# Patient Record
Sex: FEMALE | Race: WHITE
[De-identification: names, ages, dates, MRNs, and addresses within clinical notes are randomized per-mention and may not be internally consistent; named-entity substitution may affect disease eponyms.]

---

## 2017-06-14 ENCOUNTER — HOSPITAL ENCOUNTER (INPATIENT)
Dept: HOSPITAL 63 - ER | Age: 52
LOS: 4 days | Discharge: HOME | DRG: 392 | End: 2017-06-18
Attending: FAMILY MEDICINE | Admitting: FAMILY MEDICINE
Payer: SELF-PAY

## 2017-06-14 VITALS — HEIGHT: 61 IN | BODY MASS INDEX: 31.63 KG/M2 | WEIGHT: 167.5 LBS

## 2017-06-14 VITALS — DIASTOLIC BLOOD PRESSURE: 75 MMHG | SYSTOLIC BLOOD PRESSURE: 153 MMHG

## 2017-06-14 DIAGNOSIS — K31.84: Primary | ICD-10-CM

## 2017-06-14 DIAGNOSIS — Z98.51: ICD-10-CM

## 2017-06-14 DIAGNOSIS — G47.00: ICD-10-CM

## 2017-06-14 DIAGNOSIS — F15.90: ICD-10-CM

## 2017-06-14 DIAGNOSIS — Z90.49: ICD-10-CM

## 2017-06-14 DIAGNOSIS — Z90.710: ICD-10-CM

## 2017-06-14 DIAGNOSIS — D72.829: ICD-10-CM

## 2017-06-14 DIAGNOSIS — E86.0: ICD-10-CM

## 2017-06-14 DIAGNOSIS — Z93.4: ICD-10-CM

## 2017-06-14 DIAGNOSIS — G89.29: ICD-10-CM

## 2017-06-14 DIAGNOSIS — F32.9: ICD-10-CM

## 2017-06-14 DIAGNOSIS — G43.909: ICD-10-CM

## 2017-06-14 DIAGNOSIS — E63.9: ICD-10-CM

## 2017-06-14 DIAGNOSIS — F44.4: ICD-10-CM

## 2017-06-14 DIAGNOSIS — Z79.891: ICD-10-CM

## 2017-06-14 DIAGNOSIS — Z93.1: ICD-10-CM

## 2017-06-14 DIAGNOSIS — E87.6: ICD-10-CM

## 2017-06-14 DIAGNOSIS — Z98.891: ICD-10-CM

## 2017-06-14 DIAGNOSIS — K21.9: ICD-10-CM

## 2017-06-14 LAB
ALBUMIN SERPL-MCNC: 4.1 G/DL (ref 3.4–5)
ALBUMIN/GLOB SERPL: 1.2 {RATIO} (ref 1–1.7)
ALP SERPL-CCNC: 133 U/L (ref 46–116)
ALT SERPL-CCNC: 54 U/L (ref 14–59)
ANION GAP SERPL CALC-SCNC: 18 MMOL/L (ref 6–14)
APTT PPP: YELLOW S
AST SERPL-CCNC: 30 U/L (ref 15–37)
BACTERIA #/AREA URNS HPF: 0 /HPF
BASOPHILS # BLD AUTO: 0 X10^3/UL (ref 0–0.2)
BASOPHILS NFR BLD: 0 % (ref 0–3)
BILIRUB SERPL-MCNC: 0.6 MG/DL (ref 0.2–1)
BILIRUB UR QL STRIP: (no result)
BUN/CREAT SERPL: 12 (ref 6–20)
CA-I SERPL ISE-MCNC: 11 MG/DL (ref 7–20)
CALCIUM SERPL-MCNC: 8.6 MG/DL (ref 8.5–10.1)
CHLORIDE SERPL-SCNC: 100 MMOL/L (ref 98–107)
CO2 SERPL-SCNC: 23 MMOL/L (ref 21–32)
CREAT SERPL-MCNC: 0.9 MG/DL (ref 0.6–1)
EOSINOPHIL NFR BLD: 0 % (ref 0–3)
EOSINOPHIL NFR BLD: 0 X10^3/UL (ref 0–0.7)
ERYTHROCYTE [DISTWIDTH] IN BLOOD BY AUTOMATED COUNT: 17.9 % (ref 11.5–14.5)
FIBRINOGEN PPP-MCNC: (no result) MG/DL
GFR SERPLBLD BASED ON 1.73 SQ M-ARVRAT: 65.2 ML/MIN
GLOBULIN SER-MCNC: 3.5 G/DL (ref 2.2–3.8)
GLUCOSE SERPL-MCNC: 101 MG/DL (ref 70–99)
GLUCOSE UR STRIP-MCNC: (no result) MG/DL
HCT VFR BLD CALC: 41.2 % (ref 36–47)
HGB BLD-MCNC: 13.3 G/DL (ref 12–15.5)
LIPASE: 126 U/L (ref 73–393)
LYMPHOCYTES # BLD: 2.2 X10^3/UL (ref 1–4.8)
LYMPHOCYTES NFR BLD AUTO: 20 % (ref 24–48)
MCH RBC QN AUTO: 26 PG (ref 25–35)
MCHC RBC AUTO-ENTMCNC: 32 G/DL (ref 31–37)
MCV RBC AUTO: 81 FL (ref 79–100)
MONO #: 0.4 X10^3/UL (ref 0–1.1)
MONOCYTES NFR BLD: 4 % (ref 0–9)
NEUT #: 8.5 X10^3UL (ref 1.8–7.7)
NEUTROPHILS NFR BLD AUTO: 76 % (ref 31–73)
NITRITE UR QL STRIP: (no result)
PLATELET # BLD AUTO: 461 X10^3/UL (ref 140–400)
POTASSIUM SERPL-SCNC: 2.2 MMOL/L (ref 3.5–5.1)
PROT SERPL-MCNC: 7.6 G/DL (ref 6.4–8.2)
RBC # BLD AUTO: 5.09 X10^6/UL (ref 3.5–5.4)
RBC #/AREA URNS HPF: (no result) /HPF (ref 0–2)
SODIUM SERPL-SCNC: 141 MMOL/L (ref 136–145)
SP GR UR STRIP: 1.01
SQUAMOUS #/AREA URNS LPF: (no result) /LPF
UROBILINOGEN UR-MCNC: 0.2 MG/DL
WBC # BLD AUTO: 11.2 X10^3/UL (ref 4–11)
WBC #/AREA URNS HPF: (no result) /HPF (ref 0–4)

## 2017-06-14 PROCEDURE — C9113 INJ PANTOPRAZOLE SODIUM, VIA: HCPCS

## 2017-06-14 PROCEDURE — 85027 COMPLETE CBC AUTOMATED: CPT

## 2017-06-14 PROCEDURE — 83690 ASSAY OF LIPASE: CPT

## 2017-06-14 PROCEDURE — 36415 COLL VENOUS BLD VENIPUNCTURE: CPT

## 2017-06-14 PROCEDURE — 96375 TX/PRO/DX INJ NEW DRUG ADDON: CPT

## 2017-06-14 PROCEDURE — G0379 DIRECT REFER HOSPITAL OBSERV: HCPCS

## 2017-06-14 PROCEDURE — 80048 BASIC METABOLIC PNL TOTAL CA: CPT

## 2017-06-14 PROCEDURE — G0378 HOSPITAL OBSERVATION PER HR: HCPCS

## 2017-06-14 PROCEDURE — 96374 THER/PROPH/DIAG INJ IV PUSH: CPT

## 2017-06-14 PROCEDURE — 96361 HYDRATE IV INFUSION ADD-ON: CPT

## 2017-06-14 PROCEDURE — 80053 COMPREHEN METABOLIC PANEL: CPT

## 2017-06-14 PROCEDURE — 81001 URINALYSIS AUTO W/SCOPE: CPT

## 2017-06-14 PROCEDURE — 83735 ASSAY OF MAGNESIUM: CPT
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
## 2017-06-15 VITALS — SYSTOLIC BLOOD PRESSURE: 143 MMHG | DIASTOLIC BLOOD PRESSURE: 79 MMHG

## 2017-06-15 VITALS — DIASTOLIC BLOOD PRESSURE: 82 MMHG | SYSTOLIC BLOOD PRESSURE: 135 MMHG

## 2017-06-15 VITALS — SYSTOLIC BLOOD PRESSURE: 102 MMHG | DIASTOLIC BLOOD PRESSURE: 66 MMHG

## 2017-06-15 VITALS — SYSTOLIC BLOOD PRESSURE: 115 MMHG | DIASTOLIC BLOOD PRESSURE: 72 MMHG

## 2017-06-15 VITALS — SYSTOLIC BLOOD PRESSURE: 118 MMHG | DIASTOLIC BLOOD PRESSURE: 70 MMHG

## 2017-06-15 LAB
ANION GAP SERPL CALC-SCNC: 11 MMOL/L (ref 6–14)
ANION GAP SERPL CALC-SCNC: 9 MMOL/L (ref 6–14)
BASOPHILS # BLD AUTO: 0 X10^3/UL (ref 0–0.2)
BASOPHILS NFR BLD: 0 % (ref 0–3)
CA-I SERPL ISE-MCNC: 11 MG/DL (ref 7–20)
CA-I SERPL ISE-MCNC: 7 MG/DL (ref 7–20)
CALCIUM SERPL-MCNC: 8.4 MG/DL (ref 8.5–10.1)
CALCIUM SERPL-MCNC: 8.7 MG/DL (ref 8.5–10.1)
CHLORIDE SERPL-SCNC: 102 MMOL/L (ref 98–107)
CHLORIDE SERPL-SCNC: 104 MMOL/L (ref 98–107)
CO2 SERPL-SCNC: 27 MMOL/L (ref 21–32)
CO2 SERPL-SCNC: 27 MMOL/L (ref 21–32)
CREAT SERPL-MCNC: 0.7 MG/DL (ref 0.6–1)
CREAT SERPL-MCNC: 0.8 MG/DL (ref 0.6–1)
EOSINOPHIL NFR BLD: 0.1 X10^3/UL (ref 0–0.7)
EOSINOPHIL NFR BLD: 1 % (ref 0–3)
ERYTHROCYTE [DISTWIDTH] IN BLOOD BY AUTOMATED COUNT: 17.5 % (ref 11.5–14.5)
GFR SERPLBLD BASED ON 1.73 SQ M-ARVRAT: 74.7 ML/MIN
GFR SERPLBLD BASED ON 1.73 SQ M-ARVRAT: 87.2 ML/MIN
GLUCOSE SERPL-MCNC: 109 MG/DL (ref 70–99)
GLUCOSE SERPL-MCNC: 95 MG/DL (ref 70–99)
HCT VFR BLD CALC: 35.3 % (ref 36–47)
HGB BLD-MCNC: 11.5 G/DL (ref 12–15.5)
LYMPHOCYTES # BLD: 3 X10^3/UL (ref 1–4.8)
LYMPHOCYTES NFR BLD AUTO: 29 % (ref 24–48)
MCH RBC QN AUTO: 26 PG (ref 25–35)
MCHC RBC AUTO-ENTMCNC: 33 G/DL (ref 31–37)
MCV RBC AUTO: 81 FL (ref 79–100)
MONO #: 0.6 X10^3/UL (ref 0–1.1)
MONOCYTES NFR BLD: 6 % (ref 0–9)
NEUT #: 6.5 X10^3UL (ref 1.8–7.7)
NEUTROPHILS NFR BLD AUTO: 64 % (ref 31–73)
PLATELET # BLD AUTO: 305 X10^3/UL (ref 140–400)
POTASSIUM SERPL-SCNC: 2.3 MMOL/L (ref 3.5–5.1)
POTASSIUM SERPL-SCNC: 3.3 MMOL/L (ref 3.5–5.1)
RBC # BLD AUTO: 4.38 X10^6/UL (ref 3.5–5.4)
SODIUM SERPL-SCNC: 140 MMOL/L (ref 136–145)
SODIUM SERPL-SCNC: 140 MMOL/L (ref 136–145)
WBC # BLD AUTO: 10.2 X10^3/UL (ref 4–11)

## 2017-06-15 RX ADMIN — POTASSIUM CHLORIDE SCH MLS/HR: 7.46 INJECTION, SOLUTION INTRAVENOUS at 08:14

## 2017-06-15 RX ADMIN — TIZANIDINE SCH MG: 2 TABLET ORAL at 13:35

## 2017-06-15 RX ADMIN — POTASSIUM CHLORIDE SCH MLS/HR: 7.46 INJECTION, SOLUTION INTRAVENOUS at 11:41

## 2017-06-15 RX ADMIN — TIZANIDINE SCH MG: 2 TABLET ORAL at 01:50

## 2017-06-15 RX ADMIN — POTASSIUM CHLORIDE SCH MLS/HR: 7.46 INJECTION, SOLUTION INTRAVENOUS at 10:46

## 2017-06-15 RX ADMIN — MORPHINE SULFATE PRN MG: 2 INJECTION, SOLUTION INTRAMUSCULAR; INTRAVENOUS at 22:43

## 2017-06-15 RX ADMIN — TIZANIDINE SCH MG: 2 TABLET ORAL at 20:44

## 2017-06-15 RX ADMIN — ZOLPIDEM TARTRATE SCH MG: 5 TABLET ORAL at 20:44

## 2017-06-15 RX ADMIN — POTASSIUM CHLORIDE SCH MLS/HR: 7.46 INJECTION, SOLUTION INTRAVENOUS at 09:25

## 2017-06-15 RX ADMIN — PANTOPRAZOLE SODIUM SCH MG: 40 INJECTION, POWDER, FOR SOLUTION INTRAVENOUS at 13:30

## 2017-06-15 RX ADMIN — TIZANIDINE SCH MG: 2 TABLET ORAL at 07:58
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
## 2017-06-15 NOTE — [facility]
Admission Criteria Forms


                                                   VOMITING





Clinical Indications for Admission to Inpatient Care





                                                                             (

Place 'X' for any and all applicable criteria):





Admission is indicated for 1 or more of the following(1)(2)(3): 


[ ]I.    Complete or partial gastrointestinal obstruction 


[ ]II.   Vomiting due to significant metabolic derangement (eg, severe 

hypercalcemia, diabetic ketoacidosis)


[ ]III.   Other cause of vomiting requiring hospitalization (eg, poisoning, 

increased intracranial pressure)


[X ]IV.   Inpatient admission required rather than observation care because of 

1 or more of the following


         [ ]i)        Hemodynamic instability


         [X ]ii)       Vomiting that is severe or persistent indicated by 1 or 

more of the following


                        1)  Numerous episodes of vomiting in past 24hours (eg, 

every 1 to 2 hours)


                        2) Suggests severe underlying cause or complication (eg

, projectile, feculent, bilious, coffee ground, bloody)


                        3) Appropriate antiemetic treatment (eg, repeated oral 

or parenteral dosing) does not sufficiently reduce vomiting within 12 to 24 

hours of treatment


                        [X]4) Treatment regimen necessary to adequately control 

vomiting requires inpatient level of care (eg, not immediately available in 

outpatient setting) 


         [X ]iii)       Severe electrolyte abnormalities requiring inpatient 

care


         [ ]iv)      Severe pain requiring acute inpatient management(

Continuous or frequent (eg, every 2 to 4 hours) 


                      parental analgesics or analgesic regimen that can only be 

performed or initiated in inpatient setting)


         [ ]v)       High fever or infection requiring inpatient admission as 

indicated by 1 or more of the following(7)(8):


                     [ ]1)   Appropriate outpatient or observation care 

antimicrobial treatment unavailable, not effective, or not feasible 


                     [ ]2)   Documented bacteremia


                     [ ]3)   Temp >104.9 degrees F (40.5 degrees C) (oral)


                     [ ]4)   Temp >103.1 degrees F (39.5 C) (oral) or <96.8 

degrees F (36 C) (rectal) that does not respond to all emergency treatment 

measures


         [ ]vi)      Acute renal failure


         [ ]vii)     IV fluid required rather than oral rehydration to replace 

significant on going losses (greater than 3 L/m2 per day)


         [ ]viii)    Parenteral nutrition regimen that must be implemented on 

inpatient basis


         [ ]ix)     Other condition, treatment or monitoring requiring 

inpatient admission 














Extended stay beyond goal length of stay may be needed for(1)(4):


[ ]a)     Severe vomiting


[ ]b)     Persistent vomiting, vital sign changes, severe electrolyte imbalance

, or diagnosed cause of vomiting that


           requires continued hospitalization (eg, gastrointestinal obstruction

, increased intracranial pressure)


[ ]c)     Surgery to treat identified causes of vomiting (eg, bowel obstruction

, intracranial process)


[ ]d)     Comorbid illness that requires inpatient care (eg, acute heart failure

, renal failure)


[ ]e)     Need for inpatient endoscopy








The original [employer] content created by [employer] has been revised. 


The portions of the content which have been revised are identified through the 

use of italic text or in bold, and [facility] 


has neither reviewed nor approved the modified material. All other unmodified 

content is copyright  [employer].





Please see references footnoted in the original [facility] edition 

2016


Admission Criteria Met?:  Yes











ASIA WELCH Manuel 15, 2017 13:38

## 2017-06-15 NOTE — [facility]
Admission Criteria Forms


                                                   VOMITING





Clinical Indications for Admission to Inpatient Care





                                                                             (

Place 'X' for any and all applicable criteria):





Admission is indicated for 1 or more of the following(1)(2)(3): 


[ ]I.    Complete or partial gastrointestinal obstruction 


[ ]II.   Vomiting due to significant metabolic derangement (eg, severe 

hypercalcemia, diabetic ketoacidosis)


[ ]III.   Other cause of vomiting requiring hospitalization (eg, poisoning, 

increased intracranial pressure)


[ ]IV.   Inpatient admission required rather than observation care because of 1 

or more of the following


         [ ]i)        Hemodynamic instability


         [ ]ii)       Vomiting that is severe or persistent indicated by 1 or 

more of the following


                        1)  Numerous episodes of vomiting in past 24hours (eg, 

every 1 to 2 hours)


                        2) Suggests severe underlying cause or complication (eg

, projectile, feculent, bilious, coffee ground, bloody)


                        3) Appropriate antiemetic treatment (eg, repeated oral 

or parenteral dosing) does not sufficiently reduce vomiting within 12 to 24 

hours of treatment


                        4) Treatment regimen necessary to adequately control 

vomiting requires inpatient level of care (eg, not immediately available in 

outpatient setting) 


         [ ]iii)       Severe electrolyte abnormalities requiring inpatient care


         [ ]iv)      Severe pain requiring acute inpatient management(

Continuous or frequent (eg, every 2 to 4 hours) 


                      parental analgesics or analgesic regimen that can only be 

performed or initiated in inpatient setting)


         [ ]v)       High fever or infection requiring inpatient admission as 

indicated by 1 or more of the following(7)(8):


                     [ ]1)   Appropriate outpatient or observation care 

antimicrobial treatment unavailable, not effective, or not feasible 


                     [ ]2)   Documented bacteremia


                     [ ]3)   Temp >104.9 degrees F (40.5 degrees C) (oral)


                     [ ]4)   Temp >103.1 degrees F (39.5 C) (oral) or <96.8 

degrees F (36 C) (rectal) that does not respond to all emergency treatment 

measures


         [ ]vi)      Acute renal failure


         [ ]vii)     IV fluid required rather than oral rehydration to replace 

significant on going losses (greater than 3 L/m2 per day)


         [ ]viii)    Parenteral nutrition regimen that must be implemented on 

inpatient basis


         [ ]ix)     Other condition, treatment or monitoring requiring 

inpatient admission 














Extended stay beyond goal length of stay may be needed for(1)(4):


[ ]a)     Severe vomiting


[ ]b)     Persistent vomiting, vital sign changes, severe electrolyte imbalance

, or diagnosed cause of vomiting that


           requires continued hospitalization (eg, gastrointestinal obstruction

, increased intracranial pressure)


[ ]c)     Surgery to treat identified causes of vomiting (eg, bowel obstruction

, intracranial process)


[ ]d)     Comorbid illness that requires inpatient care (eg, acute heart failure

, renal failure)


[ ]e)     Need for inpatient endoscopy








The original [employer] content created by [employer] has been revised. 


The portions of the content which have been revised are identified through the 

use of italic text or in bold, and [facility] 


has neither reviewed nor approved the modified material. All other unmodified 

content is copyright  [employer].





Please see references footnoted in the original [employer] edition 

2016











LOUISE FLORES. Manuel 15, 2017 06:26

## 2017-06-16 VITALS — DIASTOLIC BLOOD PRESSURE: 87 MMHG | SYSTOLIC BLOOD PRESSURE: 126 MMHG

## 2017-06-16 VITALS — SYSTOLIC BLOOD PRESSURE: 102 MMHG | DIASTOLIC BLOOD PRESSURE: 66 MMHG

## 2017-06-16 VITALS — SYSTOLIC BLOOD PRESSURE: 132 MMHG | DIASTOLIC BLOOD PRESSURE: 75 MMHG

## 2017-06-16 LAB
ANION GAP SERPL CALC-SCNC: 7 MMOL/L (ref 6–14)
BASOPHILS # BLD AUTO: 0 X10^3/UL (ref 0–0.2)
BASOPHILS NFR BLD: 1 % (ref 0–3)
CA-I SERPL ISE-MCNC: 5 MG/DL (ref 7–20)
CALCIUM SERPL-MCNC: 8.6 MG/DL (ref 8.5–10.1)
CHLORIDE SERPL-SCNC: 105 MMOL/L (ref 98–107)
CO2 SERPL-SCNC: 29 MMOL/L (ref 21–32)
CREAT SERPL-MCNC: 0.7 MG/DL (ref 0.6–1)
EOSINOPHIL NFR BLD: 0.2 X10^3/UL (ref 0–0.7)
EOSINOPHIL NFR BLD: 3 % (ref 0–3)
ERYTHROCYTE [DISTWIDTH] IN BLOOD BY AUTOMATED COUNT: 17.3 % (ref 11.5–14.5)
GFR SERPLBLD BASED ON 1.73 SQ M-ARVRAT: 87.2 ML/MIN
GLUCOSE SERPL-MCNC: 104 MG/DL (ref 70–99)
HCT VFR BLD CALC: 36.5 % (ref 36–47)
HGB BLD-MCNC: 11.7 G/DL (ref 12–15.5)
LYMPHOCYTES # BLD: 1.6 X10^3/UL (ref 1–4.8)
LYMPHOCYTES NFR BLD AUTO: 22 % (ref 24–48)
MCH RBC QN AUTO: 26 PG (ref 25–35)
MCHC RBC AUTO-ENTMCNC: 32 G/DL (ref 31–37)
MCV RBC AUTO: 82 FL (ref 79–100)
MONO #: 0.3 X10^3/UL (ref 0–1.1)
MONOCYTES NFR BLD: 5 % (ref 0–9)
NEUT #: 5 X10^3UL (ref 1.8–7.7)
NEUTROPHILS NFR BLD AUTO: 69 % (ref 31–73)
PLATELET # BLD AUTO: 271 X10^3/UL (ref 140–400)
POTASSIUM SERPL-SCNC: 3.5 MMOL/L (ref 3.5–5.1)
RBC # BLD AUTO: 4.47 X10^6/UL (ref 3.5–5.4)
SODIUM SERPL-SCNC: 141 MMOL/L (ref 136–145)
WBC # BLD AUTO: 7.2 X10^3/UL (ref 4–11)

## 2017-06-16 RX ADMIN — MORPHINE SULFATE PRN MG: 2 INJECTION, SOLUTION INTRAMUSCULAR; INTRAVENOUS at 11:12

## 2017-06-16 RX ADMIN — TIZANIDINE SCH MG: 2 TABLET ORAL at 08:32

## 2017-06-16 RX ADMIN — MORPHINE SULFATE PRN MG: 2 INJECTION, SOLUTION INTRAMUSCULAR; INTRAVENOUS at 15:19

## 2017-06-16 RX ADMIN — TIZANIDINE SCH MG: 2 TABLET ORAL at 13:22

## 2017-06-16 RX ADMIN — PROMETHAZINE HYDROCHLORIDE PRN MLS/HR: 25 INJECTION INTRAMUSCULAR; INTRAVENOUS at 16:25

## 2017-06-16 RX ADMIN — ONDANSETRON PRN MG: 2 INJECTION, SOLUTION INTRAMUSCULAR; INTRAVENOUS at 11:44

## 2017-06-16 RX ADMIN — TIZANIDINE SCH MG: 2 TABLET ORAL at 19:24

## 2017-06-16 RX ADMIN — MORPHINE SULFATE PRN MG: 2 INJECTION, SOLUTION INTRAMUSCULAR; INTRAVENOUS at 04:12

## 2017-06-16 RX ADMIN — ONDANSETRON PRN MG: 2 INJECTION, SOLUTION INTRAMUSCULAR; INTRAVENOUS at 06:08

## 2017-06-16 RX ADMIN — ZOLPIDEM TARTRATE SCH MG: 5 TABLET ORAL at 19:24

## 2017-06-16 RX ADMIN — PANTOPRAZOLE SODIUM SCH MG: 40 INJECTION, POWDER, FOR SOLUTION INTRAVENOUS at 08:31

## 2017-06-16 RX ADMIN — SODIUM CHLORIDE SCH MLS/HR: 0.9 INJECTION, SOLUTION INTRAVENOUS at 15:57

## 2017-06-16 NOTE — PN
DATE:  06/16/2017



SUBJECTIVE:  A 52-year-old female with multiple medical problems, admitted for

nausea and vomiting and diarrhea.  She has had 1 diarrhea stool since she has

been here, a couple of episodes of vomiting.  She neglected to tell me that she

has not had a feeding tube treatment in about 12 days.  She has 2 different

tubes, a G-tube and a J-tube.  The p.o. eating is basically just to keep her

esophagus open and the contents are passed into the gastrostomy tube and have no

nutritional value.  She then has a feeding tube with Isocal 1.5, which she

normally gets continuous feeding at 60 mL an hour.  She states she has not had

any in 12 days.  I was all set to send her home today; however, she is not

comfortable going home without seeing if she can tolerate the feeding.



OBJECTIVE:

VITAL SIGNS:  Blood pressure 132/75, pulse 77, respirations 18, temperature 98.2

and pulse ox 97% on room air.  Her weight today is 165.31 pounds, which states

it is up 15 pounds in one day that is not accurate.

HEENT:  Color is good.

NECK:  Supple.

LUNGS:  Clear.

CARDIOVASCULAR:  Regular rhythm and rate.

ABDOMEN:  Soft, firm.  Bowel sounds are positive.  Mildly tender.

EXTREMITIES:  Without edema.



LABORATORY DATA:  White blood cell count is now 7.2.  Potassium is now up to

3.5.



ASSESSMENT:

1.  Hypokalemia, resolved.

2.  Nausea, vomiting and diarrhea, much improved.

3.  Dehydration, improved.

4.  Leukocytosis, improved.  This is from inflammation without infection.

5.  Debilitation.

6.  Chronic use of narcotics.

7.  Chronic pain.

8.  Insomnia.



PLAN:  Start tube feeding, plan for discharge.





______________________________

MINERVA RO DO



DR:  NGOC/rupesh  JOB#:  [id number] / [id number]

DD:  06/16/2017 15:09  DT:  06/16/2017 23:29

## 2017-06-17 VITALS — DIASTOLIC BLOOD PRESSURE: 75 MMHG | SYSTOLIC BLOOD PRESSURE: 132 MMHG

## 2017-06-17 VITALS — DIASTOLIC BLOOD PRESSURE: 71 MMHG | SYSTOLIC BLOOD PRESSURE: 105 MMHG

## 2017-06-17 VITALS — DIASTOLIC BLOOD PRESSURE: 77 MMHG | SYSTOLIC BLOOD PRESSURE: 123 MMHG

## 2017-06-17 RX ADMIN — SODIUM CHLORIDE SCH MLS/HR: 0.9 INJECTION, SOLUTION INTRAVENOUS at 20:39

## 2017-06-17 RX ADMIN — TIZANIDINE SCH MG: 4 TABLET ORAL at 09:04

## 2017-06-17 RX ADMIN — MORPHINE SULFATE PRN MG: 2 INJECTION, SOLUTION INTRAMUSCULAR; INTRAVENOUS at 23:28

## 2017-06-17 RX ADMIN — MUPIROCIN SCH APP: 20 OINTMENT TOPICAL at 20:39

## 2017-06-17 RX ADMIN — TIZANIDINE SCH MG: 4 TABLET ORAL at 13:00

## 2017-06-17 RX ADMIN — SODIUM CHLORIDE SCH MLS/HR: 0.9 INJECTION, SOLUTION INTRAVENOUS at 11:39

## 2017-06-17 RX ADMIN — Medication SCH MG: at 09:00

## 2017-06-17 RX ADMIN — Medication SCH MG: at 10:15

## 2017-06-17 RX ADMIN — TIZANIDINE SCH MG: 4 TABLET ORAL at 20:39

## 2017-06-17 RX ADMIN — ONDANSETRON PRN MG: 2 INJECTION, SOLUTION INTRAMUSCULAR; INTRAVENOUS at 17:42

## 2017-06-17 RX ADMIN — METOCLOPRAMIDE SCH MG: 5 INJECTION, SOLUTION INTRAMUSCULAR; INTRAVENOUS at 14:23

## 2017-06-17 RX ADMIN — PANTOPRAZOLE SODIUM SCH MG: 40 INJECTION, POWDER, FOR SOLUTION INTRAVENOUS at 07:30

## 2017-06-17 RX ADMIN — METOCLOPRAMIDE SCH MG: 5 INJECTION, SOLUTION INTRAMUSCULAR; INTRAVENOUS at 09:03

## 2017-06-17 RX ADMIN — PROMETHAZINE HYDROCHLORIDE PRN MLS/HR: 25 INJECTION INTRAMUSCULAR; INTRAVENOUS at 01:58

## 2017-06-17 RX ADMIN — METOCLOPRAMIDE SCH MG: 5 INJECTION, SOLUTION INTRAMUSCULAR; INTRAVENOUS at 22:09

## 2017-06-17 RX ADMIN — MUPIROCIN SCH APP: 20 OINTMENT TOPICAL at 11:40

## 2017-06-17 RX ADMIN — ZOLPIDEM TARTRATE SCH MG: 5 TABLET ORAL at 20:40

## 2017-06-17 RX ADMIN — SODIUM CHLORIDE SCH MLS/HR: 0.9 INJECTION, SOLUTION INTRAVENOUS at 01:24

## 2017-06-17 RX ADMIN — PROMETHAZINE HYDROCHLORIDE PRN MLS/HR: 25 INJECTION INTRAMUSCULAR; INTRAVENOUS at 07:31

## 2017-06-17 NOTE — PN
DATE:  06/17/2017



PROBLEMS:  Include:

1.  Hypokalemia.

2.  Nausea, vomiting, and diarrhea.

3.  Nutrition deficit.

4.  Dehydration, improved.

5.  Leukocytosis.

6.  Rehabilitation.

7.  Chronic use of narcotics.

8.  Chronic pain.

9.  Insomnia.



SUBJECTIVE:  Today we are working on getting her feedings up to 60 mL an hour. 

Recurring at 30 mL an hour.  She is still having quite a bit of nausea and will

add Reglan today.  She had a lot of itching which she attributes to this feeding

it is not a normal feed when she gets at home, but it is equivalent.  Her

potassium has been normalized.  Overall, still does not feel well.  We are

working try to get her some home health at home and to keep up with other and to

get her feedings up to par so that she can be discharged.



OBJECTIVE:

VITAL SIGNS:  Blood pressure 123/77, pulse 85, respirations 18, pulse ox 97% on

room air, and temperature 98.4.

GENERAL:  Color is better.

HEENT:  Tongue is moist.

NECK:  Supple.

LUNGS:  Clear.

CARDIOVASCULAR:  Regular rhythm and rate.

ABDOMEN:  Soft, nontender.  She has inflammatory areas around her G-tube and

J-tube.

EXTREMITIES:  Without edema.



LABORATORY DATA:  Deferred for today.



PLAN:  Continue ___ Reglan, 1 dose of Decadron, vitamin B6, for the nausea and

some Bactroban ointment for the areas around her insertion sites of her tubes,

and we will try very hard to discharge her tomorrow.





______________________________

MINERVA RO DO



DR:  NGOC/rupesh  JOB#:  [id number] / [id number]

DD:  06/17/2017 11:16  DT:  06/17/2017 20:29

## 2017-06-18 VITALS — SYSTOLIC BLOOD PRESSURE: 117 MMHG | DIASTOLIC BLOOD PRESSURE: 73 MMHG

## 2017-06-18 VITALS — SYSTOLIC BLOOD PRESSURE: 110 MMHG | DIASTOLIC BLOOD PRESSURE: 71 MMHG

## 2017-06-18 LAB
ANION GAP SERPL CALC-SCNC: 6 MMOL/L (ref 6–14)
BASOPHILS # BLD AUTO: 0 X10^3/UL (ref 0–0.2)
BASOPHILS NFR BLD: 0 % (ref 0–3)
CA-I SERPL ISE-MCNC: 8 MG/DL (ref 7–20)
CALCIUM SERPL-MCNC: 8.5 MG/DL (ref 8.5–10.1)
CHLORIDE SERPL-SCNC: 105 MMOL/L (ref 98–107)
CO2 SERPL-SCNC: 30 MMOL/L (ref 21–32)
CREAT SERPL-MCNC: 0.5 MG/DL (ref 0.6–1)
EOSINOPHIL NFR BLD: 0.2 X10^3/UL (ref 0–0.7)
EOSINOPHIL NFR BLD: 3 % (ref 0–3)
ERYTHROCYTE [DISTWIDTH] IN BLOOD BY AUTOMATED COUNT: 17.1 % (ref 11.5–14.5)
GFR SERPLBLD BASED ON 1.73 SQ M-ARVRAT: 128.6 ML/MIN
GLUCOSE SERPL-MCNC: 103 MG/DL (ref 70–99)
HCT VFR BLD CALC: 32 % (ref 36–47)
HGB BLD-MCNC: 10.2 G/DL (ref 12–15.5)
LYMPHOCYTES # BLD: 2.7 X10^3/UL (ref 1–4.8)
LYMPHOCYTES NFR BLD AUTO: 33 % (ref 24–48)
MAGNESIUM SERPL-MCNC: 1.9 MG/DL (ref 1.8–2.4)
MCH RBC QN AUTO: 26 PG (ref 25–35)
MCHC RBC AUTO-ENTMCNC: 32 G/DL (ref 31–37)
MCV RBC AUTO: 82 FL (ref 79–100)
MONO #: 0.3 X10^3/UL (ref 0–1.1)
MONOCYTES NFR BLD: 3 % (ref 0–9)
NEUT #: 5 X10^3UL (ref 1.8–7.7)
NEUTROPHILS NFR BLD AUTO: 61 % (ref 31–73)
PLATELET # BLD AUTO: 240 X10^3/UL (ref 140–400)
POTASSIUM SERPL-SCNC: 3.7 MMOL/L (ref 3.5–5.1)
RBC # BLD AUTO: 3.88 X10^6/UL (ref 3.5–5.4)
SODIUM SERPL-SCNC: 141 MMOL/L (ref 136–145)
WBC # BLD AUTO: 8.3 X10^3/UL (ref 4–11)

## 2017-06-18 RX ADMIN — SODIUM CHLORIDE SCH MLS/HR: 0.9 INJECTION, SOLUTION INTRAVENOUS at 06:55

## 2017-06-18 RX ADMIN — METOCLOPRAMIDE SCH MG: 5 INJECTION, SOLUTION INTRAMUSCULAR; INTRAVENOUS at 05:59

## 2017-06-18 RX ADMIN — Medication SCH MG: at 09:03

## 2017-06-18 RX ADMIN — METOCLOPRAMIDE SCH MG: 5 INJECTION, SOLUTION INTRAMUSCULAR; INTRAVENOUS at 14:28

## 2017-06-18 RX ADMIN — TIZANIDINE SCH MG: 4 TABLET ORAL at 14:28

## 2017-06-18 RX ADMIN — MUPIROCIN SCH APP: 20 OINTMENT TOPICAL at 09:07

## 2017-06-18 RX ADMIN — PANTOPRAZOLE SODIUM SCH MG: 40 INJECTION, POWDER, FOR SOLUTION INTRAVENOUS at 07:30

## 2017-06-18 RX ADMIN — Medication SCH MG: at 09:00

## 2017-06-18 RX ADMIN — ONDANSETRON PRN MG: 2 INJECTION, SOLUTION INTRAMUSCULAR; INTRAVENOUS at 01:16

## 2017-06-18 RX ADMIN — TIZANIDINE SCH MG: 4 TABLET ORAL at 08:59

## 2017-06-18 NOTE — DS
DATE OF DISCHARGE:  06/18/2017



DISCHARGE DIAGNOSES:

1.  Hypokalemia, resolved.

2.  Nausea, vomiting, and diarrhea, vomiting and diarrhea have resolved.

3.  Nutrition deficit.

4.  Dehydration, resolved.

5.  Leukocytosis without evidence of infection.

6.  Chronic use of narcotics.

7.  Chronic pain.

8.  Insomnia.

9.  G-tube status.

10.  J-tube status.

11:  Severe gastroparesis.



HOSPITAL COURSE:  This is a 52-year-old with multiple medical problems and

debilitation, who was admitted because of several weeks of unable to tolerate

her tube feedings.  She became dehydrated.  She also had copious amounts of

diarrhea, but did not have much in the hospital.  Her tube feedings were

restarted and she did get nauseated, but was able to tolerate tube feedings at

50 mL an hour.  The Reglan appeared to help her, which she is not on at home. 

She states she will ask her doctor whether she should resume the Reglan.  She

was seen by PT and OT, is able to transfer to the bathroom.  She is also pending

disability and Medicaid.



OBJECTIVE:

VITAL SIGNS:  Last blood pressure 132/75, pulse 66, respirations 20, temperature

98.6.

GENERAL:  Color much better today.

Rest of remainder of physical exam unchanged. 



Her potassium is 3.7.  Blood count down a little bit, 10.2 and 32.0.



PLAN:  Discharge home, gave her 30 oxycodone 20 mg tablets until she can 

her prescription on Wednesday.  Offered her home health and she declined, but we

will check back with her tomorrow.  We do not know if any of the pharmacies have

the oxycodone, so we will have to recheck again in a couple of hours when the

pharmacies are open.  She will follow up with Dr. Wise on her next scheduled

appointment.





______________________________

[facility] DO DR:  [email or web address]/rupesh  JOB#:  [id number] / [id number]

DD:  06/18/2017 08:56  DT:  06/18/2017 10:55

## 2017-06-25 ENCOUNTER — HOSPITAL ENCOUNTER (OUTPATIENT)
Dept: HOSPITAL 61 - ER | Age: 52
Setting detail: OBSERVATION
LOS: 2 days | Discharge: HOME | End: 2017-06-27
Attending: INTERNAL MEDICINE | Admitting: INTERNAL MEDICINE
Payer: SELF-PAY

## 2017-06-25 VITALS — BODY MASS INDEX: 36.63 KG/M2 | HEIGHT: 58 IN | WEIGHT: 174.5 LBS

## 2017-06-25 DIAGNOSIS — Z82.3: ICD-10-CM

## 2017-06-25 DIAGNOSIS — M19.90: ICD-10-CM

## 2017-06-25 DIAGNOSIS — Z80.3: ICD-10-CM

## 2017-06-25 DIAGNOSIS — I10: ICD-10-CM

## 2017-06-25 DIAGNOSIS — K31.84: ICD-10-CM

## 2017-06-25 DIAGNOSIS — Z98.82: ICD-10-CM

## 2017-06-25 DIAGNOSIS — Z83.3: ICD-10-CM

## 2017-06-25 DIAGNOSIS — F41.9: ICD-10-CM

## 2017-06-25 DIAGNOSIS — T40.605A: ICD-10-CM

## 2017-06-25 DIAGNOSIS — K94.13: Primary | ICD-10-CM

## 2017-06-25 DIAGNOSIS — G47.33: ICD-10-CM

## 2017-06-25 DIAGNOSIS — K57.90: ICD-10-CM

## 2017-06-25 DIAGNOSIS — E44.0: ICD-10-CM

## 2017-06-25 DIAGNOSIS — E66.9: ICD-10-CM
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
## 2017-06-25 DIAGNOSIS — K21.9: ICD-10-CM

## 2017-06-25 DIAGNOSIS — K80.20: ICD-10-CM

## 2017-06-25 DIAGNOSIS — S92.919A: ICD-10-CM

## 2017-06-25 DIAGNOSIS — Z87.11: ICD-10-CM

## 2017-06-25 DIAGNOSIS — G43.909: ICD-10-CM

## 2017-06-25 DIAGNOSIS — K59.03: ICD-10-CM

## 2017-06-25 DIAGNOSIS — G89.29: ICD-10-CM

## 2017-06-25 DIAGNOSIS — Z93.3: ICD-10-CM

## 2017-06-25 DIAGNOSIS — Z43.1: ICD-10-CM

## 2017-06-25 DIAGNOSIS — E87.6: ICD-10-CM

## 2017-06-25 DIAGNOSIS — G82.50: ICD-10-CM

## 2017-06-25 DIAGNOSIS — Z90.49: ICD-10-CM

## 2017-06-25 DIAGNOSIS — K86.1: ICD-10-CM

## 2017-06-25 DIAGNOSIS — K22.6: ICD-10-CM

## 2017-06-25 LAB
ALBUMIN SERPL-MCNC: 3.9 G/DL (ref 3.4–5)
ALBUMIN/GLOB SERPL: 1.1 {RATIO} (ref 1–1.7)
ALP SERPL-CCNC: 116 U/L (ref 46–116)
ALT SERPL-CCNC: 27 U/L (ref 14–59)
ANION GAP SERPL CALC-SCNC: 19 MMOL/L (ref 6–14)
AST SERPL-CCNC: 16 U/L (ref 15–37)
BACTERIA #/AREA URNS HPF: (no result) /HPF
BASOPHILS # BLD AUTO: 0 X10^3/UL (ref 0–0.2)
BASOPHILS NFR BLD: 0 % (ref 0–3)
BILIRUB SERPL-MCNC: 0.5 MG/DL (ref 0.2–1)
BILIRUB UR QL STRIP: NEGATIVE
BUN SERPL-MCNC: 6 MG/DL (ref 7–20)
BUN/CREAT SERPL: 7 (ref 6–20)
CALCIUM SERPL-MCNC: 9.2 MG/DL (ref 8.5–10.1)
CHLORIDE SERPL-SCNC: 106 MMOL/L (ref 98–107)
CO2 SERPL-SCNC: 23 MMOL/L (ref 21–32)
CREAT SERPL-MCNC: 0.9 MG/DL (ref 0.6–1)
EOSINOPHIL NFR BLD: 0 % (ref 0–3)
ERYTHROCYTE [DISTWIDTH] IN BLOOD BY AUTOMATED COUNT: 17.8 % (ref 11.5–14.5)
GFR SERPLBLD BASED ON 1.73 SQ M-ARVRAT: 65.2 ML/MIN
GLOBULIN SER-MCNC: 3.4 G/DL (ref 2.2–3.8)
GLUCOSE SERPL-MCNC: 114 MG/DL (ref 70–99)
GLUCOSE UR STRIP-MCNC: NEGATIVE MG/DL
HCT VFR BLD CALC: 39.3 % (ref 36–47)
HGB BLD-MCNC: 12.3 G/DL (ref 12–15.5)
LYMPHOCYTES # BLD: 1.9 X10^3/UL (ref 1–4.8)
LYMPHOCYTES NFR BLD AUTO: 21 % (ref 24–48)
MCH RBC QN AUTO: 26 PG (ref 25–35)
MCHC RBC AUTO-ENTMCNC: 31 G/DL (ref 31–37)
MCV RBC AUTO: 82 FL (ref 79–100)
MONOCYTES NFR BLD: 6 % (ref 0–9)
NEUTROPHILS NFR BLD AUTO: 72 % (ref 31–73)
NITRITE UR QL STRIP: NEGATIVE
PH UR STRIP: 8.5 [PH]
PLATELET # BLD AUTO: 381 X10^3/UL (ref 140–400)
POTASSIUM SERPL-SCNC: 3 MMOL/L (ref 3.5–5.1)
PROT SERPL-MCNC: 7.3 G/DL (ref 6.4–8.2)
PROT UR STRIP-MCNC: 30 MG/DL
RBC # BLD AUTO: 4.82 X10^6/UL (ref 3.5–5.4)
RBC #/AREA URNS HPF: (no result) /HPF (ref 0–2)
SODIUM SERPL-SCNC: 148 MMOL/L (ref 136–145)
SP GR UR STRIP: 1.02
SQUAMOUS #/AREA URNS LPF: (no result) /LPF
UROBILINOGEN UR-MCNC: 0.2 MG/DL
WBC # BLD AUTO: 9 X10^3/UL (ref 4–11)
WBC #/AREA URNS HPF: (no result) /HPF (ref 0–4)

## 2017-06-25 PROCEDURE — 80053 COMPREHEN METABOLIC PANEL: CPT

## 2017-06-25 PROCEDURE — 97163 PT EVAL HIGH COMPLEX 45 MIN: CPT

## 2017-06-25 PROCEDURE — 36415 COLL VENOUS BLD VENIPUNCTURE: CPT

## 2017-06-25 PROCEDURE — 81001 URINALYSIS AUTO W/SCOPE: CPT

## 2017-06-25 PROCEDURE — 97166 OT EVAL MOD COMPLEX 45 MIN: CPT

## 2017-06-25 PROCEDURE — 80048 BASIC METABOLIC PNL TOTAL CA: CPT

## 2017-06-25 PROCEDURE — 72072 X-RAY EXAM THORAC SPINE 3VWS: CPT

## 2017-06-25 PROCEDURE — 83605 ASSAY OF LACTIC ACID: CPT

## 2017-06-25 PROCEDURE — 96375 TX/PRO/DX INJ NEW DRUG ADDON: CPT

## 2017-06-25 PROCEDURE — 87086 URINE CULTURE/COLONY COUNT: CPT

## 2017-06-25 PROCEDURE — 96365 THER/PROPH/DIAG IV INF INIT: CPT

## 2017-06-25 PROCEDURE — 74177 CT ABD & PELVIS W/CONTRAST: CPT

## 2017-06-25 PROCEDURE — 99156 MOD SED OTH PHYS/QHP 5/>YRS: CPT

## 2017-06-25 PROCEDURE — 49451 REPLACE DUOD/JEJ TUBE PERC: CPT

## 2017-06-25 PROCEDURE — 99285 EMERGENCY DEPT VISIT HI MDM: CPT

## 2017-06-25 PROCEDURE — 96367 TX/PROPH/DG ADDL SEQ IV INF: CPT

## 2017-06-25 PROCEDURE — 96376 TX/PRO/DX INJ SAME DRUG ADON: CPT

## 2017-06-25 PROCEDURE — 93005 ELECTROCARDIOGRAM TRACING: CPT

## 2017-06-25 PROCEDURE — 83690 ASSAY OF LIPASE: CPT

## 2017-06-25 PROCEDURE — C1769 GUIDE WIRE: HCPCS

## 2017-06-25 PROCEDURE — 85027 COMPLETE CBC AUTOMATED: CPT

## 2017-06-25 PROCEDURE — G0379 DIRECT REFER HOSPITAL OBSERV: HCPCS

## 2017-06-25 PROCEDURE — G0378 HOSPITAL OBSERVATION PER HR: HCPCS

## 2017-06-25 PROCEDURE — 96366 THER/PROPH/DIAG IV INF ADDON: CPT

## 2017-06-25 RX ADMIN — FENTANYL CITRATE PRN MCG: 50 INJECTION INTRAMUSCULAR; INTRAVENOUS at 22:28

## 2017-06-25 RX ADMIN — FENTANYL CITRATE PRN MCG: 50 INJECTION INTRAMUSCULAR; INTRAVENOUS at 21:45

## 2017-06-26 VITALS — SYSTOLIC BLOOD PRESSURE: 127 MMHG | DIASTOLIC BLOOD PRESSURE: 76 MMHG

## 2017-06-26 VITALS — SYSTOLIC BLOOD PRESSURE: 122 MMHG | DIASTOLIC BLOOD PRESSURE: 88 MMHG

## 2017-06-26 VITALS — DIASTOLIC BLOOD PRESSURE: 74 MMHG | SYSTOLIC BLOOD PRESSURE: 124 MMHG

## 2017-06-26 VITALS — SYSTOLIC BLOOD PRESSURE: 170 MMHG | DIASTOLIC BLOOD PRESSURE: 90 MMHG

## 2017-06-26 VITALS — DIASTOLIC BLOOD PRESSURE: 73 MMHG | SYSTOLIC BLOOD PRESSURE: 135 MMHG

## 2017-06-26 VITALS — DIASTOLIC BLOOD PRESSURE: 93 MMHG | SYSTOLIC BLOOD PRESSURE: 136 MMHG

## 2017-06-26 VITALS — DIASTOLIC BLOOD PRESSURE: 70 MMHG | SYSTOLIC BLOOD PRESSURE: 127 MMHG

## 2017-06-26 VITALS — DIASTOLIC BLOOD PRESSURE: 82 MMHG | SYSTOLIC BLOOD PRESSURE: 126 MMHG

## 2017-06-26 VITALS — SYSTOLIC BLOOD PRESSURE: 140 MMHG | DIASTOLIC BLOOD PRESSURE: 85 MMHG

## 2017-06-26 VITALS — SYSTOLIC BLOOD PRESSURE: 133 MMHG | DIASTOLIC BLOOD PRESSURE: 80 MMHG

## 2017-06-26 VITALS — SYSTOLIC BLOOD PRESSURE: 114 MMHG | DIASTOLIC BLOOD PRESSURE: 72 MMHG

## 2017-06-26 VITALS — SYSTOLIC BLOOD PRESSURE: 125 MMHG | DIASTOLIC BLOOD PRESSURE: 56 MMHG

## 2017-06-26 LAB
ANION GAP SERPL CALC-SCNC: 12 MMOL/L (ref 6–14)
BASOPHILS # BLD AUTO: 0 X10^3/UL (ref 0–0.2)
BASOPHILS NFR BLD: 0 % (ref 0–3)
BUN SERPL-MCNC: 4 MG/DL (ref 7–20)
CALCIUM SERPL-MCNC: 8.4 MG/DL (ref 8.5–10.1)
CHLORIDE SERPL-SCNC: 107 MMOL/L (ref 98–107)
CO2 SERPL-SCNC: 25 MMOL/L (ref 21–32)
CREAT SERPL-MCNC: 0.8 MG/DL (ref 0.6–1)
EOSINOPHIL NFR BLD: 1 % (ref 0–3)
ERYTHROCYTE [DISTWIDTH] IN BLOOD BY AUTOMATED COUNT: 17.9 % (ref 11.5–14.5)
GFR SERPLBLD BASED ON 1.73 SQ M-ARVRAT: 74.7 ML/MIN
GLUCOSE SERPL-MCNC: 97 MG/DL (ref 70–99)
HCT VFR BLD CALC: 34.1 % (ref 36–47)
HGB BLD-MCNC: 11.2 G/DL (ref 12–15.5)
LYMPHOCYTES # BLD: 2.6 X10^3/UL (ref 1–4.8)
LYMPHOCYTES NFR BLD AUTO: 32 % (ref 24–48)
MCH RBC QN AUTO: 27 PG (ref 25–35)
MCHC RBC AUTO-ENTMCNC: 33 G/DL (ref 31–37)
MCV RBC AUTO: 80 FL (ref 79–100)
MONOCYTES NFR BLD: 5 % (ref 0–9)
NEUTROPHILS NFR BLD AUTO: 62 % (ref 31–73)
PLATELET # BLD AUTO: 341 X10^3/UL (ref 140–400)
POTASSIUM SERPL-SCNC: 3.3 MMOL/L (ref 3.5–5.1)
RBC # BLD AUTO: 4.24 X10^6/UL (ref 3.5–5.4)
SODIUM SERPL-SCNC: 144 MMOL/L (ref 136–145)
WBC # BLD AUTO: 8.2 X10^3/UL (ref 4–11)

## 2017-06-26 RX ADMIN — LIDOCAINE SCH PATCH: 50 PATCH CUTANEOUS at 18:45

## 2017-06-26 RX ADMIN — PROMETHAZINE HYDROCHLORIDE PRN MG: 12.5 TABLET ORAL at 20:24

## 2017-06-26 RX ADMIN — FENTANYL CITRATE PRN MCG: 50 INJECTION INTRAMUSCULAR; INTRAVENOUS at 00:56

## 2017-06-26 RX ADMIN — ONDANSETRON PRN MG: 2 INJECTION INTRAMUSCULAR; INTRAVENOUS at 02:56

## 2017-06-26 RX ADMIN — POLYETHYLENE GLYCOL 3350 SCH GM: 17 POWDER, FOR SOLUTION ORAL at 09:00

## 2017-06-26 RX ADMIN — LUBIPROSTONE SCH MCG: 8 CAPSULE, GELATIN COATED ORAL at 17:00

## 2017-06-26 RX ADMIN — FENTANYL CITRATE PRN MCG: 50 INJECTION INTRAMUSCULAR; INTRAVENOUS at 05:20

## 2017-06-26 RX ADMIN — FENTANYL CITRATE PRN MCG: 50 INJECTION INTRAMUSCULAR; INTRAVENOUS at 07:54

## 2017-06-26 RX ADMIN — CITALOPRAM HYDROBROMIDE SCH MG: 20 TABLET ORAL at 09:00

## 2017-06-26 RX ADMIN — DEXTROSE, SODIUM CHLORIDE, AND POTASSIUM CHLORIDE SCH MLS/HR: 5; .45; .15 INJECTION INTRAVENOUS at 10:15

## 2017-06-26 RX ADMIN — LUBIPROSTONE SCH MCG: 8 CAPSULE, GELATIN COATED ORAL at 08:45

## 2017-06-26 RX ADMIN — DEXTROSE, SODIUM CHLORIDE, AND POTASSIUM CHLORIDE SCH MLS/HR: 5; .45; .15 INJECTION INTRAVENOUS at 20:24

## 2017-06-26 RX ADMIN — FENTANYL CITRATE PRN MCG: 50 INJECTION INTRAMUSCULAR; INTRAVENOUS at 10:25

## 2017-06-26 RX ADMIN — FENTANYL CITRATE PRN MCG: 50 INJECTION INTRAMUSCULAR; INTRAVENOUS at 02:56

## 2017-06-26 RX ADMIN — ONDANSETRON PRN MG: 2 INJECTION INTRAMUSCULAR; INTRAVENOUS at 18:45

## 2017-06-27 VITALS — SYSTOLIC BLOOD PRESSURE: 129 MMHG | DIASTOLIC BLOOD PRESSURE: 77 MMHG

## 2017-06-27 VITALS — SYSTOLIC BLOOD PRESSURE: 124 MMHG | DIASTOLIC BLOOD PRESSURE: 78 MMHG

## 2017-06-27 VITALS — SYSTOLIC BLOOD PRESSURE: 123 MMHG | DIASTOLIC BLOOD PRESSURE: 84 MMHG

## 2017-06-27 VITALS — DIASTOLIC BLOOD PRESSURE: 76 MMHG | SYSTOLIC BLOOD PRESSURE: 133 MMHG

## 2017-06-27 RX ADMIN — ONDANSETRON PRN MG: 4 TABLET, ORALLY DISINTEGRATING ORAL at 14:54

## 2017-06-27 RX ADMIN — LUBIPROSTONE SCH MCG: 8 CAPSULE, GELATIN COATED ORAL at 16:46

## 2017-06-27 RX ADMIN — OXYCODONE HYDROCHLORIDE AND ACETAMINOPHEN PRN TAB: 10; 325 TABLET ORAL at 01:11

## 2017-06-27 RX ADMIN — ONDANSETRON PRN MG: 4 TABLET, ORALLY DISINTEGRATING ORAL at 01:11

## 2017-06-27 RX ADMIN — DEXTROSE, SODIUM CHLORIDE, AND POTASSIUM CHLORIDE SCH MLS/HR: 5; .45; .15 INJECTION INTRAVENOUS at 06:08

## 2017-06-27 RX ADMIN — OXYCODONE HYDROCHLORIDE AND ACETAMINOPHEN PRN TAB: 10; 325 TABLET ORAL at 08:15

## 2017-06-27 RX ADMIN — CITALOPRAM HYDROBROMIDE SCH MG: 20 TABLET ORAL at 08:16

## 2017-06-27 RX ADMIN — LIDOCAINE SCH PATCH: 50 PATCH CUTANEOUS at 08:15

## 2017-06-27 RX ADMIN — POLYETHYLENE GLYCOL 3350 SCH GM: 17 POWDER, FOR SOLUTION ORAL at 08:05

## 2017-06-27 RX ADMIN — DEXTROSE, SODIUM CHLORIDE, AND POTASSIUM CHLORIDE SCH MLS/HR: 5; .45; .15 INJECTION INTRAVENOUS at 15:13

## 2017-06-27 RX ADMIN — LUBIPROSTONE SCH MCG: 8 CAPSULE, GELATIN COATED ORAL at 08:00

## 2017-06-27 RX ADMIN — PROMETHAZINE HYDROCHLORIDE PRN MG: 12.5 TABLET ORAL at 10:53

## 2017-07-20 ENCOUNTER — HOSPITAL ENCOUNTER (EMERGENCY)
Dept: HOSPITAL 63 - ER | Age: 52
Discharge: ANOTHER HOSPITAL | End: 2017-07-20
Payer: SELF-PAY

## 2017-07-20 ENCOUNTER — HOSPITAL ENCOUNTER (INPATIENT)
Dept: HOSPITAL 61 - 5 SOUTH | Age: 52
LOS: 5 days | Discharge: HOME | DRG: 391 | End: 2017-07-25
Attending: INTERNAL MEDICINE | Admitting: INTERNAL MEDICINE
Payer: SELF-PAY

## 2017-07-20 VITALS — WEIGHT: 169 LBS | BODY MASS INDEX: 35.48 KG/M2 | HEIGHT: 58 IN

## 2017-07-20 VITALS — DIASTOLIC BLOOD PRESSURE: 69 MMHG | SYSTOLIC BLOOD PRESSURE: 105 MMHG

## 2017-07-20 VITALS — DIASTOLIC BLOOD PRESSURE: 92 MMHG | SYSTOLIC BLOOD PRESSURE: 115 MMHG

## 2017-07-20 DIAGNOSIS — I10: ICD-10-CM

## 2017-07-20 DIAGNOSIS — E55.9: ICD-10-CM

## 2017-07-20 DIAGNOSIS — E78.5: ICD-10-CM

## 2017-07-20 DIAGNOSIS — Z88.6: ICD-10-CM

## 2017-07-20 DIAGNOSIS — Z79.899: ICD-10-CM

## 2017-07-20 DIAGNOSIS — G43.909: ICD-10-CM

## 2017-07-20 DIAGNOSIS — K57.90: ICD-10-CM

## 2017-07-20 DIAGNOSIS — Z82.3: ICD-10-CM

## 2017-07-20 DIAGNOSIS — Z98.51: ICD-10-CM

## 2017-07-20 DIAGNOSIS — Z88.8: ICD-10-CM

## 2017-07-20 DIAGNOSIS — Z79.82: ICD-10-CM

## 2017-07-20 DIAGNOSIS — G82.20: ICD-10-CM
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
## 2017-07-20 DIAGNOSIS — N39.0: ICD-10-CM

## 2017-07-20 DIAGNOSIS — E66.01: ICD-10-CM

## 2017-07-20 DIAGNOSIS — F41.9: ICD-10-CM

## 2017-07-20 DIAGNOSIS — G47.33: ICD-10-CM

## 2017-07-20 DIAGNOSIS — Z83.3: ICD-10-CM

## 2017-07-20 DIAGNOSIS — Z87.11: ICD-10-CM

## 2017-07-20 DIAGNOSIS — E43: ICD-10-CM

## 2017-07-20 DIAGNOSIS — Z99.81: ICD-10-CM

## 2017-07-20 DIAGNOSIS — R10.84: ICD-10-CM

## 2017-07-20 DIAGNOSIS — K59.03: ICD-10-CM

## 2017-07-20 DIAGNOSIS — Z88.5: ICD-10-CM

## 2017-07-20 DIAGNOSIS — Z93.1: ICD-10-CM

## 2017-07-20 DIAGNOSIS — R20.0: ICD-10-CM

## 2017-07-20 DIAGNOSIS — K31.84: Primary | ICD-10-CM

## 2017-07-20 DIAGNOSIS — Z76.5: ICD-10-CM

## 2017-07-20 DIAGNOSIS — T40.605A: ICD-10-CM

## 2017-07-20 DIAGNOSIS — K21.9: ICD-10-CM

## 2017-07-20 DIAGNOSIS — Z90.710: ICD-10-CM

## 2017-07-20 DIAGNOSIS — G89.29: ICD-10-CM

## 2017-07-20 DIAGNOSIS — K21.0: ICD-10-CM

## 2017-07-20 DIAGNOSIS — R07.89: Primary | ICD-10-CM

## 2017-07-20 DIAGNOSIS — E11.43: ICD-10-CM

## 2017-07-20 DIAGNOSIS — Z90.49: ICD-10-CM

## 2017-07-20 DIAGNOSIS — G45.9: ICD-10-CM

## 2017-07-20 LAB
AMORPH SED URNS QL MICRO: PRESENT /HPF
ANION GAP SERPL CALC-SCNC: 7 MMOL/L (ref 6–14)
APTT PPP: (no result) S
BACTERIA #/AREA URNS HPF: 0 /HPF
BASOPHILS # BLD AUTO: 0 X10^3/UL (ref 0–0.2)
BASOPHILS NFR BLD: 1 % (ref 0–3)
BILIRUB UR QL STRIP: (no result)
CA-I SERPL ISE-MCNC: 9 MG/DL (ref 7–20)
CALCIUM SERPL-MCNC: 9.1 MG/DL (ref 8.5–10.1)
CHLORIDE SERPL-SCNC: 105 MMOL/L (ref 98–107)
CO2 SERPL-SCNC: 29 MMOL/L (ref 21–32)
CREAT SERPL-MCNC: 0.9 MG/DL (ref 0.6–1)
EOSINOPHIL NFR BLD: 0.2 X10^3/UL (ref 0–0.7)
EOSINOPHIL NFR BLD: 3 % (ref 0–3)
ERYTHROCYTE [DISTWIDTH] IN BLOOD BY AUTOMATED COUNT: 17.7 % (ref 11.5–14.5)
FIBRINOGEN PPP-MCNC: CLEAR MG/DL
GFR SERPLBLD BASED ON 1.73 SQ M-ARVRAT: 65.2 ML/MIN
GLUCOSE SERPL-MCNC: 111 MG/DL (ref 70–99)
GLUCOSE UR STRIP-MCNC: (no result) MG/DL
HCT VFR BLD CALC: 34.7 % (ref 36–47)
HGB BLD-MCNC: 11.2 G/DL (ref 12–15.5)
HYALINE CASTS #/AREA URNS LPF: (no result) /HPF
LYMPHOCYTES # BLD: 2.2 X10^3/UL (ref 1–4.8)
LYMPHOCYTES NFR BLD AUTO: 33 % (ref 24–48)
MAGNESIUM SERPL-MCNC: 2 MG/DL (ref 1.8–2.4)
MCH RBC QN AUTO: 26 PG (ref 25–35)
MCHC RBC AUTO-ENTMCNC: 32 G/DL (ref 31–37)
MCV RBC AUTO: 80 FL (ref 79–100)
MONO #: 0.3 X10^3/UL (ref 0–1.1)
MONOCYTES NFR BLD: 5 % (ref 0–9)
NEUT #: 3.9 X10^3UL (ref 1.8–7.7)
NEUTROPHILS NFR BLD AUTO: 59 % (ref 31–73)
NITRITE UR QL STRIP: (no result)
PLATELET # BLD AUTO: 381 X10^3/UL (ref 140–400)
POTASSIUM SERPL-SCNC: 3.3 MMOL/L (ref 3.5–5.1)
RBC # BLD AUTO: 4.32 X10^6/UL (ref 3.5–5.4)
RBC #/AREA URNS HPF: (no result) /HPF (ref 0–2)
SODIUM SERPL-SCNC: 141 MMOL/L (ref 136–145)
SP GR UR STRIP: 1.01
SQUAMOUS #/AREA URNS LPF: (no result) /LPF
UROBILINOGEN UR-MCNC: 0.2 MG/DL
WBC # BLD AUTO: 6.7 X10^3/UL (ref 4–11)
WBC #/AREA URNS HPF: (no result) /HPF (ref 0–4)

## 2017-07-20 PROCEDURE — C1769 GUIDE WIRE: HCPCS

## 2017-07-20 PROCEDURE — 85027 COMPLETE CBC AUTOMATED: CPT

## 2017-07-20 PROCEDURE — 82607 VITAMIN B-12: CPT

## 2017-07-20 PROCEDURE — 81001 URINALYSIS AUTO W/SCOPE: CPT

## 2017-07-20 PROCEDURE — 36415 COLL VENOUS BLD VENIPUNCTURE: CPT

## 2017-07-20 PROCEDURE — 99152 MOD SED SAME PHYS/QHP 5/>YRS: CPT

## 2017-07-20 PROCEDURE — S0028 INJECTION, FAMOTIDINE, 20 MG: HCPCS

## 2017-07-20 PROCEDURE — 80053 COMPREHEN METABOLIC PANEL: CPT

## 2017-07-20 PROCEDURE — 84443 ASSAY THYROID STIM HORMONE: CPT

## 2017-07-20 PROCEDURE — 49450 REPLACE G/C TUBE PERC: CPT

## 2017-07-20 PROCEDURE — 87186 SC STD MICRODIL/AGAR DIL: CPT

## 2017-07-20 PROCEDURE — 82550 ASSAY OF CK (CPK): CPT

## 2017-07-20 PROCEDURE — 85610 PROTHROMBIN TIME: CPT

## 2017-07-20 PROCEDURE — 93005 ELECTROCARDIOGRAM TRACING: CPT

## 2017-07-20 PROCEDURE — 83735 ASSAY OF MAGNESIUM: CPT

## 2017-07-20 PROCEDURE — 80061 LIPID PANEL: CPT

## 2017-07-20 PROCEDURE — 70450 CT HEAD/BRAIN W/O DYE: CPT

## 2017-07-20 PROCEDURE — 82306 VITAMIN D 25 HYDROXY: CPT

## 2017-07-20 PROCEDURE — 87086 URINE CULTURE/COLONY COUNT: CPT

## 2017-07-20 PROCEDURE — 70551 MRI BRAIN STEM W/O DYE: CPT

## 2017-07-20 PROCEDURE — 84134 ASSAY OF PREALBUMIN: CPT

## 2017-07-20 PROCEDURE — 80048 BASIC METABOLIC PNL TOTAL CA: CPT

## 2017-07-20 PROCEDURE — 82140 ASSAY OF AMMONIA: CPT

## 2017-07-20 RX ADMIN — OXYCODONE HYDROCHLORIDE AND ACETAMINOPHEN PRN TAB: 10; 325 TABLET ORAL at 22:45

## 2017-07-20 RX ADMIN — NORTRIPTYLINE HYDROCHLORIDE SCH MG: 10 CAPSULE ORAL at 22:45

## 2017-07-20 RX ADMIN — SUCRALFATE SCH GM: 1 SUSPENSION ORAL at 22:30

## 2017-07-20 RX ADMIN — DEXTROSE, SODIUM CHLORIDE, AND POTASSIUM CHLORIDE SCH MLS/HR: 5; .45; .15 INJECTION INTRAVENOUS at 23:02

## 2017-07-20 RX ADMIN — ZOLPIDEM TARTRATE SCH MG: 5 TABLET ORAL at 23:02

## 2017-07-20 NOTE — PHYS DOC
Past History


Past Medical History:  Depression, Gallstones, GERD, Migraines, Other


Past Surgical History:  Appendectomy, Cholecystectomy, , Hysterectomy, 

Tubal ligation, Other


Alcohol Use:  None


Drug Use:  None





Adult General


Chief Complaint


Chief Complaint:  NEURO SYMPTOMS/DEFICITS





[facility]





Patient is a 52 year old F who presents with decreased sensation on the left 

side of the face and left upper extremity that started this morning when she 

woke up. She does also describe inconsistent weakness as well as severe chest 

pressure over the past 3-4 days. Her chest pressure does not have clear 

exacerbating or alleviating factors.





Review of Systems


Review of Systems





Constitutional: Denies fever or chills []


Eyes: Denies change in visual acuity, redness, or eye pain []


HENT: Denies nasal congestion or sore throat []


Respiratory: Denies cough or shortness of breath []


Cardiovascular: No additional information not addressed in HPI []


GI: Generalized abdominal pain


: Denies dysuria or hematuria []


Musculoskeletal: Denies back pain or joint pain []


Integument: Denies rash or skin lesions []


Neurologic: Negative except history of present illness


Endocrine: Denies polyuria or polydipsia []





Family History


Family History


Noncontributory





Current Medications


Current Medications


Medications reviewed





Allergies


Allergies














Coded Allergies Type Severity Reaction Last Updated Verified


 


  Timolol Allergy Intermediate HYPOTENSION 16 Yes


 


  tramadol Adverse Reaction Intermediate HALLUCINATIONS 11/26/15 Yes











Physical Exam


Physical Exam





Constitutional: Well developed, well nourished, no acute distress, non-toxic 

appearance. []


HENT: Normocephalic, atraumatic, oropharynx moist, no oral exudates, nose 

normal. [] Very poor dentition


Eyes: PERRLA, EOMI, conjunctiva normal, no discharge. [] 


Neck: Normal range of motion, no tenderness, supple, no stridor. [] 


Cardiovascular: Sinus bradycardia


Lungs & Thorax:  Bilateral breath sounds clear to auscultation []


Abdomen: Bowel sounds normal, soft, no tenderness, no masses, no pulsatile 

masses. [] 


Skin: Warm, dry, no erythema, no rash. [] 


Back: No tenderness, no CVA tenderness. [] 


Extremities: No tenderness, no cyanosis, no clubbing, ROM intact, no edema. [] 


Neurologic: Alert and oriented X 3, decreased sensation on the left side of the 

face and left upper extremity not following any consistent nerve pattern


Psychologic: Affect normal, judgement normal, mood normal. []





Current Patient Data


Vital Signs


Refer to nursing documentation


Lab Results





 Laboratory Tests








Test


  17


17:25


 


White Blood Count


  6.7 x10^3/uL


(4.0-11.0)


 


Red Blood Count


  4.32 x10^6/uL


(3.50-5.40)


 


Hemoglobin


  11.2 g/dL


(12.0-15.5)  L


 


Hematocrit


  34.7 %


(36.0-47.0)  L


 


Mean Corpuscular Volume


  80 fL ()


 


 


Mean Corpuscular Hemoglobin 26 pg (25-35)  


 


Mean Corpuscular Hemoglobin


Concent 32 g/dL


(31-37)


 


Red Cell Distribution Width


  17.7 %


(11.5-14.5)  H


 


Platelet Count


  381 x10^3/uL


(140-400)


 


Neutrophils (%) (Auto) 59 % (31-73)  


 


Lymphocytes (%) (Auto) 33 % (24-48)  


 


Monocytes (%) (Auto) 5 % (0-9)  


 


Eosinophils (%) (Auto) 3 % (0-3)  


 


Basophils (%) (Auto) 1 % (0-3)  


 


Neutrophils # (Auto)


  3.9 x10^3uL


(1.8-7.7)


 


Lymphocytes # (Auto)


  2.2 x10^3/uL


(1.0-4.8)


 


Monocytes # (Auto)


  0.3 x10^3/uL


(0.0-1.1)


 


Eosinophils # (Auto)


  0.2 x10^3/uL


(0.0-0.7)


 


Basophils # (Auto)


  0.0 x10^3/uL


(0.0-0.2)


 


Sodium Level


  141 mmol/L


(136-145)


 


Potassium Level


  3.3 mmol/L


(3.5-5.1)  L


 


Chloride Level


  105 mmol/L


()


 


Carbon Dioxide Level


  29 mmol/L


(21-32)


 


Anion Gap 7 (6-14)  


 


Blood Urea Nitrogen


  9 mg/dL (7-20)


 


 


Creatinine


  0.9 mg/dL


(0.6-1.0)


 


Estimated GFR


(Cockcroft-Gault) 65.2  


 


 


Glucose Level


  111 mg/dL


(70-99)  H


 


Calcium Level


  9.1 mg/dL


(8.5-10.1)


 


Magnesium Level


  2.0 mg/dL


(1.8-2.4)


 


Ammonia


  < 10 mcmol/L


(11-34)  L











EKG


EKG


Sinus bradycardia with first-degree AV block





Radiology/Procedures


Radiology/Procedures


Head CT negative





Course & Med Decision Making


Course & Med Decision Making


Pertinent Labs and Imaging studies reviewed. (See chart for details)





[]





Dragon Disclaimer


Dragon Disclaimer


This chart was dictated in whole or in part using Voice Recognition software in 

a busy, high-work load, and often noisy Emergency Department environment.  It 

may contain unintended and wholly unrecognized errors or omissions.





Departure


Departure:


Impression:  


 Primary Impression:  


 Chest pain


 Additional Impression:  


 Numbness


Disposition:  05 XFER OTHER


Condition:  STABLE


Referrals:  


PCP,NO (PCP)





Problem Qualifiers








 Primary Impression:  


 Chest pain


 Chest pain type:  unspecified  Qualified Codes:  R07.9 - Chest pain, 

unspecified








RONNIE SOTOMAYOR MD 2017 18:04

## 2017-07-20 NOTE — RAD
EXAM: CT head without contrast.



HISTORY: Lower extremity weakness, altered mental status, found down.



TECHNIQUE: Computed tomography of the head was performed without intravenous

contrast.



COMPARISON: 5/24/2016.



FINDINGS: There is no intracranial hemorrhage. Gray-white differentiation is

preserved. The ventricles are normal in size and position.    



The visualized paranasal sinuses appear clear. The orbits are unremarkable.

The temporal bones are unremarkable. The calvarium reveals no suspicious

lesions.    



IMPRESSION:

1. No acute intracranial findings.



These findings were called to Dr. Gonsales by Coy Mullen on 7/20/2017 at

1640.





*One or more of the following individualized dose reduction techniques were

utilized for this examination:  

1. Automated exposure control.  

2. Adjustment of the mA and/or kV according to patient size.  

3. Use of iterative reconstruction technique.

## 2017-07-21 VITALS — DIASTOLIC BLOOD PRESSURE: 89 MMHG | SYSTOLIC BLOOD PRESSURE: 134 MMHG

## 2017-07-21 VITALS — DIASTOLIC BLOOD PRESSURE: 44 MMHG | SYSTOLIC BLOOD PRESSURE: 76 MMHG

## 2017-07-21 VITALS — DIASTOLIC BLOOD PRESSURE: 88 MMHG | SYSTOLIC BLOOD PRESSURE: 116 MMHG

## 2017-07-21 VITALS — SYSTOLIC BLOOD PRESSURE: 150 MMHG | DIASTOLIC BLOOD PRESSURE: 88 MMHG

## 2017-07-21 VITALS — SYSTOLIC BLOOD PRESSURE: 161 MMHG | DIASTOLIC BLOOD PRESSURE: 101 MMHG

## 2017-07-21 VITALS — SYSTOLIC BLOOD PRESSURE: 136 MMHG | DIASTOLIC BLOOD PRESSURE: 83 MMHG

## 2017-07-21 LAB
ALBUMIN SERPL-MCNC: 2.8 G/DL (ref 3.4–5)
ALBUMIN/GLOB SERPL: 0.9 {RATIO} (ref 1–1.7)
ALP SERPL-CCNC: 108 U/L (ref 46–116)
ALT SERPL-CCNC: 14 U/L (ref 14–59)
ANION GAP SERPL CALC-SCNC: 10 MMOL/L (ref 6–14)
AST SERPL-CCNC: 12 U/L (ref 15–37)
BACTERIA #/AREA URNS HPF: (no result) /HPF
BASOPHILS # BLD AUTO: 0 X10^3/UL (ref 0–0.2)
BASOPHILS NFR BLD: 0 % (ref 0–3)
BILIRUB SERPL-MCNC: 0.3 MG/DL (ref 0.2–1)
BILIRUB UR QL STRIP: NEGATIVE
BUN SERPL-MCNC: 7 MG/DL (ref 7–20)
BUN/CREAT SERPL: 9 (ref 6–20)
CALCIUM SERPL-MCNC: 8.5 MG/DL (ref 8.5–10.1)
CHLORIDE SERPL-SCNC: 104 MMOL/L (ref 98–107)
CHOLEST SERPL-MCNC: 167 MG/DL (ref 0–200)
CHOLEST/HDLC SERPL: 4.6 {RATIO}
CO2 SERPL-SCNC: 27 MMOL/L (ref 21–32)
CREAT SERPL-MCNC: 0.8 MG/DL (ref 0.6–1)
EOSINOPHIL NFR BLD: 3 % (ref 0–3)
ERYTHROCYTE [DISTWIDTH] IN BLOOD BY AUTOMATED COUNT: 17.8 % (ref 11.5–14.5)
GFR SERPLBLD BASED ON 1.73 SQ M-ARVRAT: 74.7 ML/MIN
GLOBULIN SER-MCNC: 3.1 G/DL (ref 2.2–3.8)
GLUCOSE SERPL-MCNC: 99 MG/DL (ref 70–99)
GLUCOSE UR STRIP-MCNC: NEGATIVE MG/DL
HCT VFR BLD CALC: 34.2 % (ref 36–47)
HDLC SERPL-MCNC: 36 MG/DL (ref 40–60)
HGB BLD-MCNC: 10.8 G/DL (ref 12–15.5)
LYMPHOCYTES # BLD: 2.5 X10^3/UL (ref 1–4.8)
LYMPHOCYTES NFR BLD AUTO: 35 % (ref 24–48)
MCH RBC QN AUTO: 26 PG (ref 25–35)
MCHC RBC AUTO-ENTMCNC: 32 G/DL (ref 31–37)
MCV RBC AUTO: 82 FL (ref 79–100)
MONOCYTES NFR BLD: 5 % (ref 0–9)
NEUTROPHILS NFR BLD AUTO: 58 % (ref 31–73)
NITRITE UR QL STRIP: NEGATIVE
NONHDLC SERPL-MCNC: 131 MG/DL (ref 0–129)
PH UR STRIP: 6.5 [PH]
PLATELET # BLD AUTO: 337 X10^3/UL (ref 140–400)
POTASSIUM SERPL-SCNC: 3.6 MMOL/L (ref 3.5–5.1)
PROT SERPL-MCNC: 5.9 G/DL (ref 6.4–8.2)
PROT UR STRIP-MCNC: NEGATIVE MG/DL
RBC # BLD AUTO: 4.16 X10^6/UL (ref 3.5–5.4)
RBC #/AREA URNS HPF: 0 /HPF (ref 0–2)
SODIUM SERPL-SCNC: 141 MMOL/L (ref 136–145)
SP GR UR STRIP: <=1.005
SQUAMOUS #/AREA URNS LPF: (no result) /LPF
TRIGL SERPL-MCNC: 152 MG/DL (ref 0–150)
UROBILINOGEN UR-MCNC: 0.2 MG/DL
WBC # BLD AUTO: 7.2 X10^3/UL (ref 4–11)

## 2017-07-21 RX ADMIN — CITALOPRAM HYDROBROMIDE SCH MG: 20 TABLET ORAL at 09:02

## 2017-07-21 RX ADMIN — TIZANIDINE PRN MG: 4 TABLET ORAL at 01:53

## 2017-07-21 RX ADMIN — DEXTROSE, SODIUM CHLORIDE, AND POTASSIUM CHLORIDE SCH MLS/HR: 5; .45; .15 INJECTION INTRAVENOUS at 20:40

## 2017-07-21 RX ADMIN — GABAPENTIN SCH MG: 300 CAPSULE ORAL at 20:39

## 2017-07-21 RX ADMIN — ONDANSETRON PRN MG: 4 TABLET, ORALLY DISINTEGRATING ORAL at 00:20

## 2017-07-21 RX ADMIN — GABAPENTIN SCH MG: 300 CAPSULE ORAL at 13:00

## 2017-07-21 RX ADMIN — DEXTROSE, SODIUM CHLORIDE, AND POTASSIUM CHLORIDE SCH MLS/HR: 5; .45; .15 INJECTION INTRAVENOUS at 09:18

## 2017-07-21 RX ADMIN — GABAPENTIN SCH MG: 300 CAPSULE ORAL at 09:03

## 2017-07-21 RX ADMIN — SUCRALFATE SCH GM: 1 SUSPENSION ORAL at 09:03

## 2017-07-21 RX ADMIN — FAMOTIDINE SCH MG: 10 INJECTION, SOLUTION INTRAVENOUS at 20:39

## 2017-07-21 RX ADMIN — SUCRALFATE SCH GM: 1 SUSPENSION ORAL at 13:01

## 2017-07-21 RX ADMIN — POLYETHYLENE GLYCOL 3350 SCH GM: 17 POWDER, FOR SOLUTION ORAL at 09:00

## 2017-07-21 RX ADMIN — NORTRIPTYLINE HYDROCHLORIDE SCH MG: 10 CAPSULE ORAL at 20:39

## 2017-07-21 RX ADMIN — SIMVASTATIN SCH MG: 20 TABLET, FILM COATED ORAL at 20:39

## 2017-07-21 RX ADMIN — SUCRALFATE SCH GM: 1 SUSPENSION ORAL at 20:37

## 2017-07-21 RX ADMIN — ASPIRIN 81 MG SCH MG: 81 TABLET ORAL at 18:04

## 2017-07-21 RX ADMIN — ZOLPIDEM TARTRATE SCH MG: 5 TABLET ORAL at 20:39

## 2017-07-21 RX ADMIN — SUCRALFATE SCH GM: 1 SUSPENSION ORAL at 18:04

## 2017-07-22 VITALS — DIASTOLIC BLOOD PRESSURE: 66 MMHG | SYSTOLIC BLOOD PRESSURE: 114 MMHG

## 2017-07-22 VITALS — SYSTOLIC BLOOD PRESSURE: 137 MMHG | DIASTOLIC BLOOD PRESSURE: 86 MMHG

## 2017-07-22 VITALS — SYSTOLIC BLOOD PRESSURE: 138 MMHG | DIASTOLIC BLOOD PRESSURE: 84 MMHG

## 2017-07-22 VITALS — DIASTOLIC BLOOD PRESSURE: 83 MMHG | SYSTOLIC BLOOD PRESSURE: 132 MMHG

## 2017-07-22 VITALS — SYSTOLIC BLOOD PRESSURE: 131 MMHG | DIASTOLIC BLOOD PRESSURE: 72 MMHG

## 2017-07-22 RX ADMIN — SIMVASTATIN SCH MG: 20 TABLET, FILM COATED ORAL at 21:17

## 2017-07-22 RX ADMIN — FAMOTIDINE SCH MG: 10 INJECTION, SOLUTION INTRAVENOUS at 21:18

## 2017-07-22 RX ADMIN — CHOLECALCIFEROL CAP 125 MCG (5000 UNIT) SCH UNIT: 125 CAP at 12:55

## 2017-07-22 RX ADMIN — GABAPENTIN SCH MG: 300 CAPSULE ORAL at 10:28

## 2017-07-22 RX ADMIN — DEXTROSE, SODIUM CHLORIDE, AND POTASSIUM CHLORIDE SCH MLS/HR: 5; .45; .15 INJECTION INTRAVENOUS at 06:36

## 2017-07-22 RX ADMIN — TIZANIDINE PRN MG: 4 TABLET ORAL at 22:28

## 2017-07-22 RX ADMIN — POLYETHYLENE GLYCOL 3350 SCH GM: 17 POWDER, FOR SOLUTION ORAL at 09:00

## 2017-07-22 RX ADMIN — DEXTROSE, SODIUM CHLORIDE, AND POTASSIUM CHLORIDE SCH MLS/HR: 5; .45; .15 INJECTION INTRAVENOUS at 16:16

## 2017-07-22 RX ADMIN — OXYCODONE HYDROCHLORIDE AND ACETAMINOPHEN PRN TAB: 10; 325 TABLET ORAL at 21:26

## 2017-07-22 RX ADMIN — CITALOPRAM HYDROBROMIDE SCH MG: 20 TABLET ORAL at 10:28

## 2017-07-22 RX ADMIN — CALCIUM CARBONATE (ANTACID) CHEW TAB 500 MG SCH MG: 500 CHEW TAB at 21:17

## 2017-07-22 RX ADMIN — FAMOTIDINE SCH MG: 10 INJECTION, SOLUTION INTRAVENOUS at 10:27

## 2017-07-22 RX ADMIN — OXYCODONE HYDROCHLORIDE AND ACETAMINOPHEN PRN TAB: 10; 325 TABLET ORAL at 16:42

## 2017-07-22 RX ADMIN — ASPIRIN 81 MG SCH MG: 81 TABLET ORAL at 10:28

## 2017-07-22 RX ADMIN — TIZANIDINE PRN MG: 4 TABLET ORAL at 02:10

## 2017-07-22 RX ADMIN — ONDANSETRON PRN MG: 4 TABLET, ORALLY DISINTEGRATING ORAL at 06:34

## 2017-07-22 RX ADMIN — GABAPENTIN SCH MG: 300 CAPSULE ORAL at 21:17

## 2017-07-22 RX ADMIN — ZOLPIDEM TARTRATE SCH MG: 5 TABLET ORAL at 21:17

## 2017-07-22 RX ADMIN — SUCRALFATE SCH GM: 1 SUSPENSION ORAL at 07:30

## 2017-07-22 RX ADMIN — CALCIUM CARBONATE (ANTACID) CHEW TAB 500 MG SCH MG: 500 CHEW TAB at 12:55

## 2017-07-22 RX ADMIN — SUCRALFATE SCH GM: 1 SUSPENSION ORAL at 10:30

## 2017-07-22 RX ADMIN — SUCRALFATE SCH GM: 1 SUSPENSION ORAL at 21:17

## 2017-07-22 RX ADMIN — SUCRALFATE SCH GM: 1 SUSPENSION ORAL at 16:40

## 2017-07-22 RX ADMIN — ONDANSETRON PRN MG: 4 TABLET, ORALLY DISINTEGRATING ORAL at 16:48

## 2017-07-22 RX ADMIN — GABAPENTIN SCH MG: 300 CAPSULE ORAL at 13:51

## 2017-07-22 RX ADMIN — SUCRALFATE SCH GM: 1 SUSPENSION ORAL at 21:00

## 2017-07-22 RX ADMIN — NORTRIPTYLINE HYDROCHLORIDE SCH MG: 10 CAPSULE ORAL at 21:17

## 2017-07-23 VITALS — SYSTOLIC BLOOD PRESSURE: 137 MMHG | DIASTOLIC BLOOD PRESSURE: 91 MMHG

## 2017-07-23 VITALS — DIASTOLIC BLOOD PRESSURE: 73 MMHG | SYSTOLIC BLOOD PRESSURE: 102 MMHG

## 2017-07-23 VITALS — SYSTOLIC BLOOD PRESSURE: 115 MMHG | DIASTOLIC BLOOD PRESSURE: 80 MMHG

## 2017-07-23 VITALS — DIASTOLIC BLOOD PRESSURE: 80 MMHG | SYSTOLIC BLOOD PRESSURE: 142 MMHG

## 2017-07-23 VITALS — SYSTOLIC BLOOD PRESSURE: 145 MMHG | DIASTOLIC BLOOD PRESSURE: 83 MMHG

## 2017-07-23 RX ADMIN — ASPIRIN 81 MG SCH MG: 81 TABLET ORAL at 10:27

## 2017-07-23 RX ADMIN — TIZANIDINE PRN MG: 4 TABLET ORAL at 20:42

## 2017-07-23 RX ADMIN — ONDANSETRON PRN MG: 4 TABLET, ORALLY DISINTEGRATING ORAL at 03:44

## 2017-07-23 RX ADMIN — GABAPENTIN SCH MG: 300 CAPSULE ORAL at 20:39

## 2017-07-23 RX ADMIN — CALCIUM CARBONATE (ANTACID) CHEW TAB 500 MG SCH MG: 500 CHEW TAB at 20:39

## 2017-07-23 RX ADMIN — CITALOPRAM HYDROBROMIDE SCH MG: 20 TABLET ORAL at 10:26

## 2017-07-23 RX ADMIN — GABAPENTIN SCH MG: 300 CAPSULE ORAL at 14:31

## 2017-07-23 RX ADMIN — TIZANIDINE PRN MG: 4 TABLET ORAL at 03:44

## 2017-07-23 RX ADMIN — SUCRALFATE SCH GM: 1 SUSPENSION ORAL at 20:38

## 2017-07-23 RX ADMIN — ZOLPIDEM TARTRATE SCH MG: 5 TABLET ORAL at 20:39

## 2017-07-23 RX ADMIN — OXYCODONE HYDROCHLORIDE AND ACETAMINOPHEN PRN TAB: 10; 325 TABLET ORAL at 08:14

## 2017-07-23 RX ADMIN — SIMVASTATIN SCH MG: 20 TABLET, FILM COATED ORAL at 20:39

## 2017-07-23 RX ADMIN — GABAPENTIN SCH MG: 300 CAPSULE ORAL at 10:27

## 2017-07-23 RX ADMIN — SUCRALFATE SCH GM: 1 SUSPENSION ORAL at 08:14

## 2017-07-23 RX ADMIN — CHOLECALCIFEROL CAP 125 MCG (5000 UNIT) SCH UNIT: 125 CAP at 10:26

## 2017-07-23 RX ADMIN — SUCRALFATE SCH GM: 1 SUSPENSION ORAL at 17:02

## 2017-07-23 RX ADMIN — FAMOTIDINE SCH MG: 10 INJECTION, SOLUTION INTRAVENOUS at 10:27

## 2017-07-23 RX ADMIN — DEXTROSE, SODIUM CHLORIDE, AND POTASSIUM CHLORIDE SCH MLS/HR: 5; .45; .15 INJECTION INTRAVENOUS at 04:14

## 2017-07-23 RX ADMIN — NORTRIPTYLINE HYDROCHLORIDE SCH MG: 10 CAPSULE ORAL at 20:39

## 2017-07-23 RX ADMIN — SUCRALFATE SCH GM: 1 SUSPENSION ORAL at 12:33

## 2017-07-23 RX ADMIN — POLYETHYLENE GLYCOL 3350 SCH GM: 17 POWDER, FOR SOLUTION ORAL at 10:26

## 2017-07-23 RX ADMIN — OXYCODONE HYDROCHLORIDE AND ACETAMINOPHEN PRN TAB: 10; 325 TABLET ORAL at 20:40

## 2017-07-23 RX ADMIN — DEXTROSE, SODIUM CHLORIDE, AND POTASSIUM CHLORIDE SCH MLS/HR: 5; .45; .15 INJECTION INTRAVENOUS at 10:28

## 2017-07-23 RX ADMIN — FAMOTIDINE SCH MG: 10 INJECTION, SOLUTION INTRAVENOUS at 20:39

## 2017-07-23 RX ADMIN — CALCIUM CARBONATE (ANTACID) CHEW TAB 500 MG SCH MG: 500 CHEW TAB at 10:27

## 2017-07-23 RX ADMIN — FOLIC ACID-PYRIDOXINE-CYANOCOBALAMIN TAB 2.5-25-2 MG SCH TAB: 2.5-25-2 TAB at 14:31

## 2017-07-23 RX ADMIN — OXYCODONE HYDROCHLORIDE AND ACETAMINOPHEN PRN TAB: 10; 325 TABLET ORAL at 03:44

## 2017-07-24 VITALS — DIASTOLIC BLOOD PRESSURE: 84 MMHG | SYSTOLIC BLOOD PRESSURE: 149 MMHG

## 2017-07-24 VITALS — SYSTOLIC BLOOD PRESSURE: 135 MMHG | DIASTOLIC BLOOD PRESSURE: 92 MMHG

## 2017-07-24 VITALS — SYSTOLIC BLOOD PRESSURE: 149 MMHG | DIASTOLIC BLOOD PRESSURE: 85 MMHG

## 2017-07-24 VITALS — DIASTOLIC BLOOD PRESSURE: 71 MMHG | SYSTOLIC BLOOD PRESSURE: 151 MMHG

## 2017-07-24 VITALS — DIASTOLIC BLOOD PRESSURE: 91 MMHG | SYSTOLIC BLOOD PRESSURE: 124 MMHG

## 2017-07-24 VITALS — DIASTOLIC BLOOD PRESSURE: 89 MMHG | SYSTOLIC BLOOD PRESSURE: 137 MMHG

## 2017-07-24 VITALS — DIASTOLIC BLOOD PRESSURE: 95 MMHG | SYSTOLIC BLOOD PRESSURE: 154 MMHG

## 2017-07-24 VITALS — DIASTOLIC BLOOD PRESSURE: 88 MMHG | SYSTOLIC BLOOD PRESSURE: 164 MMHG

## 2017-07-24 VITALS — DIASTOLIC BLOOD PRESSURE: 82 MMHG | SYSTOLIC BLOOD PRESSURE: 145 MMHG

## 2017-07-24 VITALS — DIASTOLIC BLOOD PRESSURE: 72 MMHG | SYSTOLIC BLOOD PRESSURE: 138 MMHG

## 2017-07-24 LAB
INR PPP: 1 (ref 0.8–1.1)
PROTHROMBIN TIME: 12.8 SEC (ref 11.7–14)

## 2017-07-24 PROCEDURE — 0D20XUZ CHANGE FEEDING DEVICE IN UPPER INTESTINAL TRACT, EXTERNAL APPROACH: ICD-10-PCS | Performed by: RADIOLOGY

## 2017-07-24 RX ADMIN — CITALOPRAM HYDROBROMIDE SCH MG: 20 TABLET ORAL at 17:39

## 2017-07-24 RX ADMIN — OXYCODONE HYDROCHLORIDE AND ACETAMINOPHEN PRN TAB: 10; 325 TABLET ORAL at 23:46

## 2017-07-24 RX ADMIN — ONDANSETRON PRN MG: 2 INJECTION INTRAMUSCULAR; INTRAVENOUS at 23:46

## 2017-07-24 RX ADMIN — GABAPENTIN SCH MG: 300 CAPSULE ORAL at 20:06

## 2017-07-24 RX ADMIN — OXYCODONE HYDROCHLORIDE AND ACETAMINOPHEN PRN TAB: 10; 325 TABLET ORAL at 19:13

## 2017-07-24 RX ADMIN — NORTRIPTYLINE HYDROCHLORIDE SCH MG: 10 CAPSULE ORAL at 20:06

## 2017-07-24 RX ADMIN — SUCRALFATE SCH GM: 1 SUSPENSION ORAL at 16:30

## 2017-07-24 RX ADMIN — CALCIUM CARBONATE (ANTACID) CHEW TAB 500 MG SCH MG: 500 CHEW TAB at 09:00

## 2017-07-24 RX ADMIN — SUCRALFATE SCH GM: 1 SUSPENSION ORAL at 17:39

## 2017-07-24 RX ADMIN — GABAPENTIN SCH MG: 300 CAPSULE ORAL at 14:00

## 2017-07-24 RX ADMIN — SUCRALFATE SCH GM: 1 SUSPENSION ORAL at 20:06

## 2017-07-24 RX ADMIN — TIZANIDINE PRN MG: 4 TABLET ORAL at 02:40

## 2017-07-24 RX ADMIN — ONDANSETRON PRN MG: 4 TABLET, ORALLY DISINTEGRATING ORAL at 02:49

## 2017-07-24 RX ADMIN — ONDANSETRON PRN MG: 2 INJECTION INTRAMUSCULAR; INTRAVENOUS at 12:23

## 2017-07-24 RX ADMIN — FAMOTIDINE SCH MG: 10 INJECTION, SOLUTION INTRAVENOUS at 20:07

## 2017-07-24 RX ADMIN — SUCRALFATE SCH GM: 1 SUSPENSION ORAL at 11:30

## 2017-07-24 RX ADMIN — CALCIUM CARBONATE (ANTACID) CHEW TAB 500 MG SCH MG: 500 CHEW TAB at 20:06

## 2017-07-24 RX ADMIN — ASPIRIN 81 MG SCH MG: 81 TABLET ORAL at 17:39

## 2017-07-24 RX ADMIN — POLYETHYLENE GLYCOL 3350 SCH GM: 17 POWDER, FOR SOLUTION ORAL at 09:00

## 2017-07-24 RX ADMIN — FAMOTIDINE SCH MG: 10 INJECTION, SOLUTION INTRAVENOUS at 17:40

## 2017-07-24 RX ADMIN — FOLIC ACID-PYRIDOXINE-CYANOCOBALAMIN TAB 2.5-25-2 MG SCH TAB: 2.5-25-2 TAB at 09:00

## 2017-07-24 RX ADMIN — SIMVASTATIN SCH MG: 20 TABLET, FILM COATED ORAL at 20:06

## 2017-07-24 RX ADMIN — LIDOCAINE HYDROCHLORIDE PRN ML: 20 SOLUTION ORAL; TOPICAL at 17:40

## 2017-07-24 RX ADMIN — GABAPENTIN SCH MG: 300 CAPSULE ORAL at 17:39

## 2017-07-24 RX ADMIN — ZOLPIDEM TARTRATE SCH MG: 5 TABLET ORAL at 20:06

## 2017-07-24 RX ADMIN — OXYCODONE HYDROCHLORIDE AND ACETAMINOPHEN PRN TAB: 10; 325 TABLET ORAL at 02:40

## 2017-07-25 VITALS — DIASTOLIC BLOOD PRESSURE: 108 MMHG | SYSTOLIC BLOOD PRESSURE: 152 MMHG

## 2017-07-25 VITALS — DIASTOLIC BLOOD PRESSURE: 89 MMHG | SYSTOLIC BLOOD PRESSURE: 131 MMHG

## 2017-07-25 VITALS — DIASTOLIC BLOOD PRESSURE: 80 MMHG | SYSTOLIC BLOOD PRESSURE: 126 MMHG

## 2017-07-25 VITALS — SYSTOLIC BLOOD PRESSURE: 148 MMHG | DIASTOLIC BLOOD PRESSURE: 89 MMHG

## 2017-07-25 RX ADMIN — POLYETHYLENE GLYCOL 3350 SCH GM: 17 POWDER, FOR SOLUTION ORAL at 09:00

## 2017-07-25 RX ADMIN — FAMOTIDINE SCH MG: 10 INJECTION, SOLUTION INTRAVENOUS at 10:19

## 2017-07-25 RX ADMIN — GABAPENTIN SCH MG: 300 CAPSULE ORAL at 15:52

## 2017-07-25 RX ADMIN — OXYCODONE HYDROCHLORIDE AND ACETAMINOPHEN PRN TAB: 10; 325 TABLET ORAL at 10:21

## 2017-07-25 RX ADMIN — FOLIC ACID-PYRIDOXINE-CYANOCOBALAMIN TAB 2.5-25-2 MG SCH TAB: 2.5-25-2 TAB at 10:20

## 2017-07-25 RX ADMIN — SUCRALFATE SCH GM: 1 SUSPENSION ORAL at 10:22

## 2017-07-25 RX ADMIN — SUCRALFATE SCH GM: 1 SUSPENSION ORAL at 15:52

## 2017-07-25 RX ADMIN — ONDANSETRON PRN MG: 2 INJECTION INTRAMUSCULAR; INTRAVENOUS at 10:20

## 2017-07-25 RX ADMIN — LIDOCAINE HYDROCHLORIDE PRN ML: 20 SOLUTION ORAL; TOPICAL at 10:20

## 2017-07-25 RX ADMIN — LIDOCAINE HYDROCHLORIDE PRN ML: 20 SOLUTION ORAL; TOPICAL at 17:13

## 2017-07-25 RX ADMIN — GABAPENTIN SCH MG: 300 CAPSULE ORAL at 10:20

## 2017-07-25 RX ADMIN — CALCIUM CARBONATE (ANTACID) CHEW TAB 500 MG SCH MG: 500 CHEW TAB at 09:00

## 2017-07-25 RX ADMIN — SUCRALFATE SCH GM: 1 SUSPENSION ORAL at 16:30

## 2017-07-25 RX ADMIN — ASPIRIN 81 MG SCH MG: 81 TABLET ORAL at 10:20

## 2017-07-25 RX ADMIN — CITALOPRAM HYDROBROMIDE SCH MG: 20 TABLET ORAL at 10:20

## 2017-08-15 ENCOUNTER — HOSPITAL ENCOUNTER (EMERGENCY)
Dept: HOSPITAL 63 - ER | Age: 52
Discharge: ANOTHER HOSPITAL | End: 2017-08-15
Payer: SELF-PAY

## 2017-08-15 VITALS — SYSTOLIC BLOOD PRESSURE: 139 MMHG | DIASTOLIC BLOOD PRESSURE: 87 MMHG
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
## 2017-08-15 DIAGNOSIS — Z88.8: ICD-10-CM

## 2017-08-15 DIAGNOSIS — S06.9X3A: Primary | ICD-10-CM

## 2017-08-15 DIAGNOSIS — G89.29: ICD-10-CM

## 2017-08-15 DIAGNOSIS — K21.9: ICD-10-CM

## 2017-08-15 DIAGNOSIS — Z88.6: ICD-10-CM

## 2017-08-15 DIAGNOSIS — G43.909: ICD-10-CM
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
## 2017-08-15 DIAGNOSIS — Z90.710: ICD-10-CM

## 2017-08-15 DIAGNOSIS — R29.6: ICD-10-CM

## 2017-08-15 DIAGNOSIS — Z90.49: ICD-10-CM

## 2017-08-15 DIAGNOSIS — E87.6: ICD-10-CM

## 2017-08-15 DIAGNOSIS — Z98.51: ICD-10-CM

## 2017-08-15 DIAGNOSIS — K94.13: ICD-10-CM

## 2017-08-15 LAB
ALBUMIN SERPL-MCNC: 3.5 G/DL (ref 3.4–5)
ALBUMIN/GLOB SERPL: 1 {RATIO} (ref 1–1.7)
ALP SERPL-CCNC: 127 U/L (ref 46–116)
ALT SERPL-CCNC: 14 U/L (ref 14–59)
ANION GAP SERPL CALC-SCNC: 12 MMOL/L (ref 6–14)
APTT PPP: YELLOW S
AST SERPL-CCNC: 14 U/L (ref 15–37)
BACTERIA #/AREA URNS HPF: 0 /HPF
BASOPHILS # BLD AUTO: 0 X10^3/UL (ref 0–0.2)
BASOPHILS NFR BLD: 1 % (ref 0–3)
BILIRUB SERPL-MCNC: 0.7 MG/DL (ref 0.2–1)
BILIRUB UR QL STRIP: (no result)
BUN/CREAT SERPL: 9 (ref 6–20)
CA-I SERPL ISE-MCNC: 8 MG/DL (ref 7–20)
CALCIUM SERPL-MCNC: 8.7 MG/DL (ref 8.5–10.1)
CHLORIDE SERPL-SCNC: 106 MMOL/L (ref 98–107)
CK SERPL-CCNC: 28 U/L (ref 26–192)
CO2 SERPL-SCNC: 25 MMOL/L (ref 21–32)
CREAT SERPL-MCNC: 0.9 MG/DL (ref 0.6–1)
EOSINOPHIL NFR BLD: 0 % (ref 0–3)
EOSINOPHIL NFR BLD: 0 X10^3/UL (ref 0–0.7)
ERYTHROCYTE [DISTWIDTH] IN BLOOD BY AUTOMATED COUNT: 18 % (ref 11.5–14.5)
FIBRINOGEN PPP-MCNC: CLEAR MG/DL
GFR SERPLBLD BASED ON 1.73 SQ M-ARVRAT: 65.2 ML/MIN
GLOBULIN SER-MCNC: 3.4 G/DL (ref 2.2–3.8)
GLUCOSE SERPL-MCNC: 110 MG/DL (ref 70–99)
GLUCOSE UR STRIP-MCNC: (no result) MG/DL
HCT VFR BLD CALC: 37.1 % (ref 36–47)
HGB BLD-MCNC: 11.9 G/DL (ref 12–15.5)
HYALINE CASTS #/AREA URNS LPF: (no result) /HPF
LIPASE: 66 U/L (ref 73–393)
LYMPHOCYTES # BLD: 2 X10^3/UL (ref 1–4.8)
LYMPHOCYTES NFR BLD AUTO: 25 % (ref 24–48)
MCH RBC QN AUTO: 26 PG (ref 25–35)
MCHC RBC AUTO-ENTMCNC: 32 G/DL (ref 31–37)
MCV RBC AUTO: 81 FL (ref 79–100)
MONO #: 0.4 X10^3/UL (ref 0–1.1)
MONOCYTES NFR BLD: 5 % (ref 0–9)
NEUT #: 5.3 X10^3UL (ref 1.8–7.7)
NEUTROPHILS NFR BLD AUTO: 69 % (ref 31–73)
NITRITE UR QL STRIP: (no result)
PLATELET # BLD AUTO: 332 X10^3/UL (ref 140–400)
POTASSIUM SERPL-SCNC: 2.5 MMOL/L (ref 3.5–5.1)
PROT SERPL-MCNC: 6.9 G/DL (ref 6.4–8.2)
RBC # BLD AUTO: 4.61 X10^6/UL (ref 3.5–5.4)
RBC #/AREA URNS HPF: (no result) /HPF (ref 0–2)
SODIUM SERPL-SCNC: 143 MMOL/L (ref 136–145)
SP GR UR STRIP: 1.01
SQUAMOUS #/AREA URNS LPF: (no result) /LPF
UROBILINOGEN UR-MCNC: 0.2 MG/DL
WBC # BLD AUTO: 7.7 X10^3/UL (ref 4–11)
WBC #/AREA URNS HPF: (no result) /HPF (ref 0–4)

## 2017-08-15 PROCEDURE — S0028 INJECTION, FAMOTIDINE, 20 MG: HCPCS

## 2017-08-15 PROCEDURE — 74022 RADEX COMPL AQT ABD SERIES: CPT

## 2017-08-15 PROCEDURE — 99285 EMERGENCY DEPT VISIT HI MDM: CPT

## 2017-08-15 PROCEDURE — 85025 COMPLETE CBC W/AUTO DIFF WBC: CPT

## 2017-08-15 PROCEDURE — 87205 SMEAR GRAM STAIN: CPT

## 2017-08-15 PROCEDURE — 96375 TX/PRO/DX INJ NEW DRUG ADDON: CPT

## 2017-08-15 PROCEDURE — 70450 CT HEAD/BRAIN W/O DYE: CPT

## 2017-08-15 PROCEDURE — 96361 HYDRATE IV INFUSION ADD-ON: CPT

## 2017-08-15 PROCEDURE — 87040 BLOOD CULTURE FOR BACTERIA: CPT

## 2017-08-15 PROCEDURE — 82550 ASSAY OF CK (CPK): CPT

## 2017-08-15 PROCEDURE — 83690 ASSAY OF LIPASE: CPT

## 2017-08-15 PROCEDURE — 96372 THER/PROPH/DIAG INJ SC/IM: CPT

## 2017-08-15 PROCEDURE — 96376 TX/PRO/DX INJ SAME DRUG ADON: CPT

## 2017-08-15 PROCEDURE — 74177 CT ABD & PELVIS W/CONTRAST: CPT

## 2017-08-15 PROCEDURE — 83605 ASSAY OF LACTIC ACID: CPT

## 2017-08-15 PROCEDURE — 72125 CT NECK SPINE W/O DYE: CPT

## 2017-08-15 PROCEDURE — 93005 ELECTROCARDIOGRAM TRACING: CPT

## 2017-08-15 PROCEDURE — 84484 ASSAY OF TROPONIN QUANT: CPT

## 2017-08-15 PROCEDURE — 36415 COLL VENOUS BLD VENIPUNCTURE: CPT

## 2017-08-15 PROCEDURE — 81001 URINALYSIS AUTO W/SCOPE: CPT

## 2017-08-15 PROCEDURE — 80053 COMPREHEN METABOLIC PANEL: CPT

## 2017-08-15 PROCEDURE — 96365 THER/PROPH/DIAG IV INF INIT: CPT

## 2017-08-15 RX ADMIN — HYDROMORPHONE HYDROCHLORIDE PRN MG: 1 INJECTION, SOLUTION INTRAMUSCULAR; INTRAVENOUS; SUBCUTANEOUS at 12:04

## 2017-08-15 RX ADMIN — HYDROMORPHONE HYDROCHLORIDE PRN MG: 1 INJECTION, SOLUTION INTRAMUSCULAR; INTRAVENOUS; SUBCUTANEOUS at 12:24
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
## 2017-08-15 NOTE — RAD
CT abdomen/pelvis with contrast 8/15/2017 at 0911 hours



Indication: Nausea and vomiting.



Comparison: CT abdomen/pelvis 3/4/2017



Technique: Multiple axial CT images of the abdomen and pelvis were obtained

after the administration of 75 mL Omnipaque 300. Coronal and sagittal

reformats are provided.



Findings:



Bilateral breast prostheses are present. Visualized portions of the lung bases

are clear. Heart size is within normal limits.



Hypoattenuation of the hepatic parenchyma is suggestive of hepatic steatosis.

Gallbladder surgically absent. Spleen, bilateral adrenal glands, and pancreas

are within normal limits. There is no intrahepatic or extrahepatic biliary

ductal dilatation.



The abdominal aorta is normal in course and caliber. There are no

pathologically enlarged lymph nodes within the abdomen or pelvis. There is no

abdominal free air. There is no free fluid within the abdomen or pelvis.



The kidneys enhance symmetrically. No suspicious renal masses are identified.

There is no hydronephrosis. No renal calculi.



A gastrostomy tube is identified within the gastric lumen. Small and large

bowel are normal in caliber. No pericolonic inflammatory changes are present.

The appendix is not definitively visualized.



Urinary bladder is within normal limits. No suspicious osseous lesions are

identified.



Impression:

1. Gastrostomy tube is identified centered within the gastric lumen. No

evidence for bowel obstruction.

2. Status post cholecystectomy without evidence for intrahepatic or

extrahepatic biliary dilatation.

3. There is suggestion of hepatic steatosis.











PQRS Compliance Statement:



One or more of the following individualized dose reduction techniques were

utilized for this examination:

1. Automated exposure control

2. Adjustment of the mA and/or kV according to patient size

3. Use of iterative reconstruction technique

## 2017-08-15 NOTE — RAD
Acute abdominal series with single view chest 8/15/2017 at 0924 hours



Indication: Nausea and vomiting. Feeding tube to allow 9 days ago.



Comparison: CT abdomen/pelvis 8/15/2017



Technique: Frontal view of the chest, upright view of the abdomen and supine

view of the abdomen are provided.



Findings:



A right chest wall infusion port catheter is identified with the distal tip

projecting over the cavoatrial junction. The cardiomediastinal silhouette is

within normal limits. There are no pleural effusions. No prevascular

congestion or pneumothorax. Lungs are clear.



There is no free intraperitoneal air. Cholecystectomy clips are identified in

the right upper quadrant. There are no dilated loops of small or large bowel.

Gastrostomy tube projects over the gastric lumen. Visualized osseous

structures are within normal limits. Contrast is identified within the renal

collecting system and urinary bladder.



Impression:

1. No acute cardiopulmonary process.

2. Nonobstructive bowel gas pattern. Gastrostomy tube projects over gastric

lumen. There is no free intraperitoneal air.

## 2017-08-15 NOTE — EKG
[facility] [address], [place] [address]

Test Date:    2017-08-15               Test Time:    07:41:08

Pat Name:     ALBERTA SANCHEZ              Department:   

Patient ID:   [id number]           Room:          

Gender:       F                        Technician:   GILMA

:          1965               Requested By: GRAY BLAKELY

Order Number: [id number]            Reading MD:     

                                 Measurements

Intervals                              Axis          

Rate:         60                       P:            50

SC:           200                      QRS:          18

QRSD:         84                       T:            37

QT:           460                                    

QTc:          465                                    

                           Interpretive Statements

SINUS RHYTHM

QRS(T) CONTOUR ABNORMALITY

CONSIDER ANTEROSEPTAL MYOCARDIAL DAMAGE

RI6.01          Unconfirmed report

No previous ECG available for comparison

## 2017-08-15 NOTE — RAD
CT head and cervical spine without contrast 8/15/2017 at 0857 hours



Indication: Fall 2 days ago with head trauma.



Comparison: Head and cervical spine 5/24/2016



Technique: Multiple axial noncontrast CT images of the head were obtained from

the skull base through the vertex. Noncontrast axial CT images of the cervical

spine were obtained. Coronal and sagittal reformats are provided.



Findings:



Head:



The ventricles, sulci and basal cisterns are within normal limits. Gray-white

matter differentiation is normal. There is no acute intracranial hemorrhage.

There is no mass, mass effect or midline shift. Posterior fossa is within

normal limits. Sellar and suprasellar cistern appear normal.



Orbits are normal in appearance. Paranasal sinuses are well aerated. Mastoid

air cells are well aerated. Scalp and calvaria are normal.



Cervical spine:



Alignment of the cervical spine is normal. Craniocervical junction is normal.

Atlantoaxial articulation is normal. Skull base is normal.



There is no acute fracture.



There is posterior disc osteophyte complex is noted at C5-C6 and C6-C7

resulting in at least mild spinal canal stenosis. Mild multilevel facet

arthropathy is noted. There is mild uncovertebral joint arthropathy at C5-C6

and C6-C7.



Visualized paraspinal soft tissues are within normal limits. Thyroid gland is

normal. Visualized lung apices are normal. No prevertebral soft tissue

swelling.



Impression:



There is no acute intracranial hemorrhage.



There is no acute fracture or malalignment of the cervical spine. Mild

multilevel degenerative changes are present, as detailed above.

   











PQRS Compliance Statement:



One or more of the following individualized dose reduction techniques were

utilized for this examination:

1. Automated exposure control

2. Adjustment of the mA and/or kV according to patient size

3. Use of iterative reconstruction technique

## 2017-09-05 ENCOUNTER — HOSPITAL ENCOUNTER (EMERGENCY)
Dept: HOSPITAL 63 - ER | Age: 52
Discharge: ANOTHER HOSPITAL | End: 2017-09-05
Payer: SELF-PAY

## 2017-09-05 VITALS — DIASTOLIC BLOOD PRESSURE: 82 MMHG | SYSTOLIC BLOOD PRESSURE: 150 MMHG

## 2017-09-05 VITALS — WEIGHT: 293 LBS | HEIGHT: 61 IN | BODY MASS INDEX: 55.32 KG/M2

## 2017-09-05 DIAGNOSIS — G43.909: ICD-10-CM

## 2017-09-05 DIAGNOSIS — J18.9: Primary | ICD-10-CM

## 2017-09-05 DIAGNOSIS — E87.6: ICD-10-CM

## 2017-09-05 DIAGNOSIS — Z88.8: ICD-10-CM

## 2017-09-05 DIAGNOSIS — Z90.49: ICD-10-CM

## 2017-09-05 DIAGNOSIS — Z93.1: ICD-10-CM

## 2017-09-05 DIAGNOSIS — I10: ICD-10-CM

## 2017-09-05 DIAGNOSIS — Z88.6: ICD-10-CM

## 2017-09-05 LAB
ALBUMIN SERPL-MCNC: 4.2 G/DL (ref 3.4–5)
ALBUMIN/GLOB SERPL: 1.1 {RATIO} (ref 1–1.7)
ALP SERPL-CCNC: 145 U/L (ref 46–116)
ALT SERPL-CCNC: 21 U/L (ref 14–59)
ANION GAP SERPL CALC-SCNC: 21 MMOL/L (ref 6–14)
APTT PPP: YELLOW S
AST SERPL-CCNC: 19 U/L (ref 15–37)
BACTERIA #/AREA URNS HPF: 0 /HPF
BASOPHILS # BLD AUTO: 0.1 X10^3/UL (ref 0–0.2)
BASOPHILS NFR BLD: 1 % (ref 0–3)
BILIRUB SERPL-MCNC: 0.8 MG/DL (ref 0.2–1)
BILIRUB UR QL STRIP: (no result)
BUN/CREAT SERPL: 9 (ref 6–20)
CA-I SERPL ISE-MCNC: 12 MG/DL (ref 7–20)
CALCIUM SERPL-MCNC: 9.7 MG/DL (ref 8.5–10.1)
CHLORIDE SERPL-SCNC: 96 MMOL/L (ref 98–107)
CO2 SERPL-SCNC: 22 MMOL/L (ref 21–32)
CREAT SERPL-MCNC: 1.3 MG/DL (ref 0.6–1)
EOSINOPHIL NFR BLD: 0 % (ref 0–3)
EOSINOPHIL NFR BLD: 0 X10^3/UL (ref 0–0.7)
ERYTHROCYTE [DISTWIDTH] IN BLOOD BY AUTOMATED COUNT: 18.4 % (ref 11.5–14.5)
FIBRINOGEN PPP-MCNC: CLEAR MG/DL
GFR SERPLBLD BASED ON 1.73 SQ M-ARVRAT: 42.7 ML/MIN
GLOBULIN SER-MCNC: 3.9 G/DL (ref 2.2–3.8)
GLUCOSE SERPL-MCNC: 179 MG/DL (ref 70–99)
GLUCOSE UR STRIP-MCNC: (no result) MG/DL
HCT VFR BLD CALC: 41.5 % (ref 36–47)
HGB BLD-MCNC: 13.5 G/DL (ref 12–15.5)
HYALINE CASTS #/AREA URNS LPF: (no result) /HPF
LIPASE: 259 U/L (ref 73–393)
LYMPHOCYTES # BLD: 1.4 X10^3/UL (ref 1–4.8)
LYMPHOCYTES NFR BLD AUTO: 11 % (ref 24–48)
MCH RBC QN AUTO: 26 PG (ref 25–35)
MCHC RBC AUTO-ENTMCNC: 33 G/DL (ref 31–37)
MCV RBC AUTO: 80 FL (ref 79–100)
MONO #: 0.6 X10^3/UL (ref 0–1.1)
MONOCYTES NFR BLD: 5 % (ref 0–9)
NEUT #: 10.2 X10^3UL (ref 1.8–7.7)
NEUTROPHILS NFR BLD AUTO: 83 % (ref 31–73)
NITRITE UR QL STRIP: (no result)
PLATELET # BLD AUTO: 522 X10^3/UL (ref 140–400)
POTASSIUM SERPL-SCNC: 2.7 MMOL/L (ref 3.5–5.1)
PROT SERPL-MCNC: 8.1 G/DL (ref 6.4–8.2)
RBC # BLD AUTO: 5.17 X10^6/UL (ref 3.5–5.4)
RBC #/AREA URNS HPF: (no result) /HPF (ref 0–2)
SODIUM SERPL-SCNC: 139 MMOL/L (ref 136–145)
SP GR UR STRIP: 1.01
SQUAMOUS #/AREA URNS LPF: (no result) /LPF
UROBILINOGEN UR-MCNC: 0.2 MG/DL
WBC # BLD AUTO: 12.2 X10^3/UL (ref 4–11)
WBC #/AREA URNS HPF: (no result) /HPF (ref 0–4)

## 2017-09-05 PROCEDURE — 85025 COMPLETE CBC W/AUTO DIFF WBC: CPT

## 2017-09-05 PROCEDURE — 80053 COMPREHEN METABOLIC PANEL: CPT

## 2017-09-05 PROCEDURE — 96376 TX/PRO/DX INJ SAME DRUG ADON: CPT

## 2017-09-05 PROCEDURE — 74177 CT ABD & PELVIS W/CONTRAST: CPT

## 2017-09-05 PROCEDURE — 99285 EMERGENCY DEPT VISIT HI MDM: CPT

## 2017-09-05 PROCEDURE — 96375 TX/PRO/DX INJ NEW DRUG ADDON: CPT

## 2017-09-05 PROCEDURE — 81001 URINALYSIS AUTO W/SCOPE: CPT

## 2017-09-05 PROCEDURE — 83690 ASSAY OF LIPASE: CPT

## 2017-09-05 PROCEDURE — 96365 THER/PROPH/DIAG IV INF INIT: CPT

## 2017-09-05 PROCEDURE — 93005 ELECTROCARDIOGRAM TRACING: CPT

## 2017-09-05 PROCEDURE — 96361 HYDRATE IV INFUSION ADD-ON: CPT

## 2017-09-05 PROCEDURE — 96366 THER/PROPH/DIAG IV INF ADDON: CPT

## 2017-09-05 PROCEDURE — 36415 COLL VENOUS BLD VENIPUNCTURE: CPT

## 2017-09-05 NOTE — RAD
CT of the abdomen and pelvis with contrast, 9/5/2017:



History: Nausea, vomiting, abdominal pain



Multidetector CT imaging was performed following an IV bolus injection of

iodinated contrast material. Comparison is made to a study from 8/15/2017.



The gallbladder is surgically absent. No hepatic mass is seen. There is

minimal pneumobilia. Mild unchanged prominence of the common bile duct is

probably secondary to the postcholecystectomy state. No pancreatic mass is

evident. The spleen is of normal size. No renal or adrenal abnormality is

detected.



The abdominal aorta is unremarkable. No abdominal or pelvic adenopathy is

seen. The uterus is surgically absent.



The bowel loops are not dilated. Several small colonic diverticula are noted.

A gastrostomy tube is in place entering the stomach in the mid body. The

distal aspect of the gastrostomy tube is elongated and coiled in the stomach.

It does not extend into the small bowel. There are several loops of proximal

small bowel which demonstrate mild mural thickening, No free fluid or free air

is evident in the abdomen or pelvis.





IMPRESSION:

1. A gastrostomy tube is in place with its distal end coiled in the stomach.

2. Minimal pneumobilia. Has there been a previous sphincterotomy to explain

this finding?

3. Mild mural thickening involving several proximal small bowel loops

suggesting nonspecific enteritis.







A gastrostomy tube is in place, coiled within the stomach.











PQRS Compliance Statement:



One or more of the following individualized dose reduction techniques were

utilized for this examination:

1. Automated exposure control

2. Adjustment of the mA and/or kV according to patient size

3. Use of iterative reconstruction technique

## 2017-09-05 NOTE — PHYS DOC
Past History


Past Medical History:  Anxiety, Depression, Hypertension, Migraines, Other


Past Surgical History:  Appendectomy, Cholecystectomy, , Hysterectomy, 

Other


Alcohol Use:  None


Drug Use:  None





Adult General


HPI


HPI





Patient is a 52 year old F who presents with abdominal pain and nausea/vomiting/

diarrhea. Patient is a paraplegic and has a G-tube in place secondary to severe 

gastroparesis who called EMS today because she's having increasing abdominal 

pain with nausea/vomiting/diarrhea. Patient denies any fevers. Patient states 

she just get out of [facility] to have her G-tube replaced. Patient denies any chest 

pain returns of breath. Patient states her G-tube is leaking around her 

insertion site in her abdomen. Patient has no other complaints.





Review of Systems


Review of Systems


GEN: Denies fevers, chills, sweats


HEENT: Denies blurred vision, sore throat


CV: Denies chest pain


RESP: Denies shortness of air, cough


GI: n/v/d with abdominal pain


NEURO: Denies confusion, dizziness


MSK: Denies weakness, joint pain/swelling





Current Medications


Current Medications





Current Medications








 Medications


  (Trade)  Dose


 Ordered  Sig/Kari  Start Time


 Stop Time Status Last Admin


Dose Admin


 


 Fentanyl Citrate


  (Fentanyl 2ml


 Vial)  50 mcg  1X  ONCE  17 12:15


 17 12:16 UNV  


 


 


 Ondansetron HCl


  (Zofran)  4 mg  1X  ONCE  17 12:15


 17 12:16 UNV  


 


 


 Sodium Chloride  1,000 ml @ 


 1,000 mls/hr  1X  ONCE  17 12:15


 17 13:14 UNV  


 











Allergies


Allergies





Allergies








Coded Allergies Type Severity Reaction Last Updated Verified


 


  Timolol Allergy Intermediate HYPOTENSION 16 Yes


 


  tramadol Adverse Reaction Intermediate HALLUCINATIONS 11/26/15 Yes











Physical Exam


Physical Exam


GEN.:    mod distress.  Alert and oriented.


HEENT:    Head is normocephalic, atraumatic


NECK:    Supple.  


LUNGS:    CTAB.


HEART:    RRR, S1, S2 present.  Peripheral pulses intact


ABDOMEN:    Soft, generalized abdominal tenderness, erythema and drainage 

around the G-tube insertion site.  vbowel sounds.


EXTREMITIES:    Without any cyanosis.    


NEUROLOGIC:     Normal speech, normal tone


PSYCHIATRIC:    Depressed.


SKIN:   No ulcerations





EKG


EKG


1345: EKG shows normal sinus rhythm rate of 94 no STEMI[]





Radiology/Procedures


Radiology/Procedures


CT abd and pelvis:


IMPRESSION:


1. A gastrostomy tube is in place with its distal end coiled in the stomach.


2. Minimal pneumobilia. Has there been a previous sphincterotomy to explain


this finding?


3. Mild mural thickening involving several proximal small bowel loops


suggesting nonspecific enteritis.[]





Course & Med Decision Making


Course & Med Decision Making


Pertinent Labs and Imaging studies reviewed. (See chart for details)





ED course:


Patient was seen and examined in the emergency room upon arrival CBC, CMP, 

lipase, UA, CT scan abdomen and pelvis were ordered along with 1 L normal 

saline bolus along with for Motrin and Zofran and 50 g of fentanyl


1306: Patient still having significant abdominal pain and nausea vomiting 

therefore 1 mg Dilaudid and 5 mg of Haldol were given, lab reports the patient 

has a potassium 2.7 therefore IV potassium replacement will be ordered


1406:  transfer center contacted discuss case with Danilo the triage nurse


1431:  transfer center called back and they have accepted physician Dr. Mariano who will accept the patient


1435: Plan was discussed with the patient who is comfortable to transfer





[]





Dragon Disclaimer


Dragon Disclaimer


This chart was dictated in whole or in part using Voice Recognition software in 

a busy, high-work load, and often noisy Emergency Department environment.  It 

may contain unintended and wholly unrecognized errors or omissions.





Departure


Departure:


Impression:  


 Primary Impression:  


 Abdominal pain


 Additional Impressions:  


 Hypokalemia


 Pneumobilia


Disposition:  02 XFER [facility] ( Dr. Mariano is excepting)


Condition:  STABLE


Referrals:  


SOFIA ALLEN MD (PCP)





Problem Qualifiers











AKIHL IBARRA DO Sep 5, 2017 12:21

## 2017-09-05 NOTE — EKG
[facility] [address], [place] [address]

Test Date:    2017               Test Time:    13:42:22

Pat Name:     ALBERTA SANCHEZ              Department:   

Patient ID:   [id number]           Room:          

Gender:       F                        Technician:   

:          1965               Requested By: AKHIL IBARRA

Order Number: [id number]            Reading MD:   Abdoulaye Rincon

                                 Measurements

Intervals                              Axis          

Rate:         94                       P:            -90

AZ:           160                      QRS:          85

QRSD:         84                       T:            56

QT:           394                                    

QTc:          499                                    

                           Interpretive Statements

SINUS RHYTHM

PROLONGED QT INTERVAL

NON-SPECIFIC ST/T CHANGES

Electronically Signed On 2017 15:34:38 CDT by Abdoulaye Rincon

## 2017-09-25 ENCOUNTER — HOSPITAL ENCOUNTER (INPATIENT)
Dept: HOSPITAL 63 - ER | Age: 52
LOS: 1 days | Discharge: ANOTHER HOSPITAL | DRG: 393 | End: 2017-09-26
Attending: INTERNAL MEDICINE | Admitting: INTERNAL MEDICINE
Payer: SELF-PAY

## 2017-09-25 VITALS — HEIGHT: 58 IN | BODY MASS INDEX: 33.45 KG/M2 | WEIGHT: 159.37 LBS

## 2017-09-25 VITALS — DIASTOLIC BLOOD PRESSURE: 60 MMHG | SYSTOLIC BLOOD PRESSURE: 99 MMHG

## 2017-09-25 DIAGNOSIS — G43.909: ICD-10-CM

## 2017-09-25 DIAGNOSIS — K31.84: ICD-10-CM

## 2017-09-25 DIAGNOSIS — Z90.49: ICD-10-CM

## 2017-09-25 DIAGNOSIS — F32.9: ICD-10-CM

## 2017-09-25 DIAGNOSIS — Z90.722: ICD-10-CM

## 2017-09-25 DIAGNOSIS — Z87.19: ICD-10-CM

## 2017-09-25 DIAGNOSIS — E43: ICD-10-CM

## 2017-09-25 DIAGNOSIS — F41.9: ICD-10-CM

## 2017-09-25 DIAGNOSIS — I10: ICD-10-CM

## 2017-09-25 DIAGNOSIS — Z88.8: ICD-10-CM

## 2017-09-25 DIAGNOSIS — K94.23: Primary | ICD-10-CM

## 2017-09-25 DIAGNOSIS — Z90.710: ICD-10-CM

## 2017-09-25 DIAGNOSIS — E86.0: ICD-10-CM

## 2017-09-25 DIAGNOSIS — Z99.3: ICD-10-CM

## 2017-09-25 LAB
ALBUMIN SERPL-MCNC: 3 G/DL (ref 3.4–5)
ALP SERPL-CCNC: 94 U/L (ref 46–116)
ALT SERPL-CCNC: 23 U/L (ref 14–59)
ANION GAP SERPL CALC-SCNC: 6 MMOL/L (ref 6–14)
APTT PPP: YELLOW S
AST SERPL-CCNC: 16 U/L (ref 15–37)
BACTERIA #/AREA URNS HPF: 0 /HPF
BASOPHILS # BLD AUTO: 0 X10^3/UL (ref 0–0.2)
BASOPHILS NFR BLD: 0 % (ref 0–3)
BILIRUB DIRECT SERPL-MCNC: 0.2 MG/DL (ref 0–0.2)
BILIRUB SERPL-MCNC: 0.5 MG/DL (ref 0.2–1)
BILIRUB UR QL STRIP: (no result)
CA-I SERPL ISE-MCNC: 14 MG/DL (ref 7–20)
CALCIUM SERPL-MCNC: 8.2 MG/DL (ref 8.5–10.1)
CHLORIDE SERPL-SCNC: 100 MMOL/L (ref 98–107)
CO2 SERPL-SCNC: 29 MMOL/L (ref 21–32)
CREAT SERPL-MCNC: 0.8 MG/DL (ref 0.6–1)
EOSINOPHIL NFR BLD: 0.1 X10^3/UL (ref 0–0.7)
EOSINOPHIL NFR BLD: 2 % (ref 0–3)
ERYTHROCYTE [DISTWIDTH] IN BLOOD BY AUTOMATED COUNT: 17.5 % (ref 11.5–14.5)
FIBRINOGEN PPP-MCNC: CLEAR MG/DL
GFR SERPLBLD BASED ON 1.73 SQ M-ARVRAT: 74.7 ML/MIN
GLUCOSE SERPL-MCNC: 119 MG/DL (ref 70–99)
GLUCOSE UR STRIP-MCNC: (no result) MG/DL
HCT VFR BLD CALC: 24.2 % (ref 36–47)
HGB BLD-MCNC: 8.1 G/DL (ref 12–15.5)
LYMPHOCYTES # BLD: 1 X10^3/UL (ref 1–4.8)
LYMPHOCYTES NFR BLD AUTO: 17 % (ref 24–48)
MCH RBC QN AUTO: 28 PG (ref 25–35)
MCHC RBC AUTO-ENTMCNC: 34 G/DL (ref 31–37)
MCV RBC AUTO: 82 FL (ref 79–100)
MONO #: 0.3 X10^3/UL (ref 0–1.1)
MONOCYTES NFR BLD: 6 % (ref 0–9)
NEUT #: 4.4 X10^3UL (ref 1.8–7.7)
NEUTROPHILS NFR BLD AUTO: 75 % (ref 31–73)
NITRITE UR QL STRIP: (no result)
PLATELET # BLD AUTO: 261 X10^3/UL (ref 140–400)
POTASSIUM SERPL-SCNC: 3.6 MMOL/L (ref 3.5–5.1)
PROT SERPL-MCNC: 6.1 G/DL (ref 6.4–8.2)
RBC # BLD AUTO: 2.94 X10^6/UL (ref 3.5–5.4)
RBC #/AREA URNS HPF: (no result) /HPF (ref 0–2)
SODIUM SERPL-SCNC: 135 MMOL/L (ref 136–145)
SP GR UR STRIP: 1.01
SQUAMOUS #/AREA URNS LPF: (no result) /LPF
UROBILINOGEN UR-MCNC: 0.2 MG/DL
WBC # BLD AUTO: 5.9 X10^3/UL (ref 4–11)
WBC #/AREA URNS HPF: (no result) /HPF (ref 0–4)

## 2017-09-25 PROCEDURE — 85610 PROTHROMBIN TIME: CPT

## 2017-09-25 PROCEDURE — 87040 BLOOD CULTURE FOR BACTERIA: CPT

## 2017-09-25 PROCEDURE — 74000: CPT

## 2017-09-25 PROCEDURE — 93005 ELECTROCARDIOGRAM TRACING: CPT

## 2017-09-25 PROCEDURE — 83605 ASSAY OF LACTIC ACID: CPT

## 2017-09-25 PROCEDURE — 80053 COMPREHEN METABOLIC PANEL: CPT

## 2017-09-25 PROCEDURE — 80076 HEPATIC FUNCTION PANEL: CPT

## 2017-09-25 PROCEDURE — 74020: CPT

## 2017-09-25 PROCEDURE — 85730 THROMBOPLASTIN TIME PARTIAL: CPT

## 2017-09-25 PROCEDURE — 86900 BLOOD TYPING SEROLOGIC ABO: CPT

## 2017-09-25 PROCEDURE — 81001 URINALYSIS AUTO W/SCOPE: CPT

## 2017-09-25 PROCEDURE — 86850 RBC ANTIBODY SCREEN: CPT

## 2017-09-25 PROCEDURE — 96374 THER/PROPH/DIAG INJ IV PUSH: CPT

## 2017-09-25 PROCEDURE — 90686 IIV4 VACC NO PRSV 0.5 ML IM: CPT

## 2017-09-25 PROCEDURE — 36415 COLL VENOUS BLD VENIPUNCTURE: CPT

## 2017-09-25 PROCEDURE — 85025 COMPLETE CBC W/AUTO DIFF WBC: CPT

## 2017-09-25 PROCEDURE — 80048 BASIC METABOLIC PNL TOTAL CA: CPT

## 2017-09-25 PROCEDURE — 0D20X0Z CHANGE DRAINAGE DEVICE IN UPPER INTESTINAL TRACT, EXTERNAL APPROACH: ICD-10-PCS | Performed by: INTERNAL MEDICINE

## 2017-09-25 PROCEDURE — 96361 HYDRATE IV INFUSION ADD-ON: CPT

## 2017-09-25 PROCEDURE — 86901 BLOOD TYPING SEROLOGIC RH(D): CPT

## 2017-09-25 RX ADMIN — SODIUM CHLORIDE SCH MLS/HR: 0.9 INJECTION, SOLUTION INTRAVENOUS at 23:19

## 2017-09-25 RX ADMIN — TIZANIDINE SCH MG: 4 TABLET ORAL at 23:20

## 2017-09-25 RX ADMIN — GABAPENTIN SCH MG: 250 SOLUTION ORAL at 23:22
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
## 2017-09-25 NOTE — EKG
[facility] [address], [place] [address]

Test Date:    2017               Test Time:    17:37:16

Pat Name:     ALBERTA SANCHEZ              Department:   

Patient ID:   [id number]           Room:          

Gender:       F                        Technician:   DEMOND

:          1965               Requested By: SANCHO ROY

Order Number: [id number]            Reading MD:     

                                 Measurements

Intervals                              Axis          

Rate:         56                       P:            39

DC:           168                      QRS:          38

QRSD:         86                       T:            46

QT:           444                                    

QTc:          431                                    

                           Interpretive Statements

SINUS RHYTHM

LOW LIMB LEAD VOLTAGE

NO SPECIFIC ECG ABNORMALITIES

RI6.01

No previous ECG available for comparison

## 2017-09-26 VITALS — DIASTOLIC BLOOD PRESSURE: 44 MMHG | SYSTOLIC BLOOD PRESSURE: 107 MMHG

## 2017-09-26 VITALS — SYSTOLIC BLOOD PRESSURE: 101 MMHG | DIASTOLIC BLOOD PRESSURE: 64 MMHG

## 2017-09-26 VITALS — SYSTOLIC BLOOD PRESSURE: 102 MMHG | DIASTOLIC BLOOD PRESSURE: 60 MMHG

## 2017-09-26 VITALS — SYSTOLIC BLOOD PRESSURE: 105 MMHG | DIASTOLIC BLOOD PRESSURE: 71 MMHG

## 2017-09-26 LAB
ALBUMIN SERPL-MCNC: 2.8 G/DL (ref 3.4–5)
ALBUMIN/GLOB SERPL: 0.9 {RATIO} (ref 1–1.7)
ALP SERPL-CCNC: 102 U/L (ref 46–116)
ALT SERPL-CCNC: 22 U/L (ref 14–59)
ANION GAP SERPL CALC-SCNC: 5 MMOL/L (ref 6–14)
AST SERPL-CCNC: 21 U/L (ref 15–37)
BASOPHILS # BLD AUTO: 0 X10^3/UL (ref 0–0.2)
BASOPHILS NFR BLD: 1 % (ref 0–3)
BILIRUB SERPL-MCNC: 0.4 MG/DL (ref 0.2–1)
BUN/CREAT SERPL: 17 (ref 6–20)
CA-I SERPL ISE-MCNC: 10 MG/DL (ref 7–20)
CALCIUM SERPL-MCNC: 8 MG/DL (ref 8.5–10.1)
CHLORIDE SERPL-SCNC: 105 MMOL/L (ref 98–107)
CO2 SERPL-SCNC: 27 MMOL/L (ref 21–32)
CREAT SERPL-MCNC: 0.6 MG/DL (ref 0.6–1)
EOSINOPHIL NFR BLD: 0.1 X10^3/UL (ref 0–0.7)
EOSINOPHIL NFR BLD: 3 % (ref 0–3)
ERYTHROCYTE [DISTWIDTH] IN BLOOD BY AUTOMATED COUNT: 17.9 % (ref 11.5–14.5)
GFR SERPLBLD BASED ON 1.73 SQ M-ARVRAT: 104.2 ML/MIN
GLOBULIN SER-MCNC: 3 G/DL (ref 2.2–3.8)
GLUCOSE SERPL-MCNC: 135 MG/DL (ref 70–99)
HCT VFR BLD CALC: 25.1 % (ref 36–47)
HGB BLD-MCNC: 8.3 G/DL (ref 12–15.5)
LYMPHOCYTES # BLD: 0.9 X10^3/UL (ref 1–4.8)
LYMPHOCYTES NFR BLD AUTO: 23 % (ref 24–48)
MCH RBC QN AUTO: 28 PG (ref 25–35)
MCHC RBC AUTO-ENTMCNC: 33 G/DL (ref 31–37)
MCV RBC AUTO: 83 FL (ref 79–100)
MONO #: 0.3 X10^3/UL (ref 0–1.1)
MONOCYTES NFR BLD: 7 % (ref 0–9)
NEUT #: 2.7 X10^3UL (ref 1.8–7.7)
NEUTROPHILS NFR BLD AUTO: 67 % (ref 31–73)
PLATELET # BLD AUTO: 265 X10^3/UL (ref 140–400)
POTASSIUM SERPL-SCNC: 3.6 MMOL/L (ref 3.5–5.1)
PROT SERPL-MCNC: 5.8 G/DL (ref 6.4–8.2)
RBC # BLD AUTO: 3.01 X10^6/UL (ref 3.5–5.4)
SODIUM SERPL-SCNC: 137 MMOL/L (ref 136–145)
WBC # BLD AUTO: 4 X10^3/UL (ref 4–11)

## 2017-09-26 RX ADMIN — GABAPENTIN SCH MG: 250 SOLUTION ORAL at 14:50

## 2017-09-26 RX ADMIN — GABAPENTIN SCH MG: 250 SOLUTION ORAL at 09:00

## 2017-09-26 RX ADMIN — SODIUM CHLORIDE SCH MLS/HR: 0.9 INJECTION, SOLUTION INTRAVENOUS at 04:00

## 2017-09-26 RX ADMIN — TIZANIDINE SCH MG: 4 TABLET ORAL at 14:58

## 2017-09-26 RX ADMIN — TIZANIDINE SCH MG: 4 TABLET ORAL at 07:56

## 2017-09-26 NOTE — RAD
Examination: Single frontal view the abdomen



History: History of gastrostomy placement



Comparison: 9/25/2017



Findings



A tubing projects over the right mid abdomen projecting in the region of the

distal stomach or the first part of the duodenum. Contrast is identified in

the duodenum. No evidence of extraluminal contrast visualized. The bowel gas

pattern appears unremarkable. A tubing projects over the left midabdomen.



Impression:



No evidence of extraluminal extravasation of contrast. A tubing, probably a

G-tube projects in the region of the distal stomach or first part of duodenum.

## 2017-09-26 NOTE — RAD
Examination: Frontal views of the abdomen



History: History of reinsertion of G-tube



Comparison: 8/15/2017



Findings:



The contrast-enhanced injected into G-tube appears to within the lumen of the

stomach without extraluminal extravasation. Another tube projects in the

midabdomen.



Moderate amount of stool identified in the right colon.



Impression:



1. The NG tube appears to be within the stomach as the contrast is within the

stomach with no extraluminal extravasation.



2. Moderate amount of stool identified in the right colon.
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
## 2017-10-27 ENCOUNTER — HOSPITAL ENCOUNTER (EMERGENCY)
Dept: HOSPITAL 63 - ER | Age: 52
Discharge: HOME | End: 2017-10-27
Payer: SELF-PAY

## 2017-10-27 VITALS — HEIGHT: 58 IN | BODY MASS INDEX: 33.37 KG/M2 | WEIGHT: 159 LBS

## 2017-10-27 VITALS — DIASTOLIC BLOOD PRESSURE: 83 MMHG | SYSTOLIC BLOOD PRESSURE: 122 MMHG

## 2017-10-27 DIAGNOSIS — D47.3: ICD-10-CM

## 2017-10-27 DIAGNOSIS — Z88.6: ICD-10-CM

## 2017-10-27 DIAGNOSIS — F41.9: ICD-10-CM

## 2017-10-27 DIAGNOSIS — D64.9: ICD-10-CM

## 2017-10-27 DIAGNOSIS — R74.8: ICD-10-CM

## 2017-10-27 DIAGNOSIS — Z93.1: ICD-10-CM

## 2017-10-27 DIAGNOSIS — T85.528A: Primary | ICD-10-CM

## 2017-10-27 DIAGNOSIS — K21.9: ICD-10-CM

## 2017-10-27 DIAGNOSIS — Z88.8: ICD-10-CM

## 2017-10-27 DIAGNOSIS — I10: ICD-10-CM

## 2017-10-27 DIAGNOSIS — G89.29: ICD-10-CM

## 2017-10-27 DIAGNOSIS — G43.909: ICD-10-CM

## 2017-10-27 DIAGNOSIS — E44.1: ICD-10-CM

## 2017-10-27 DIAGNOSIS — E87.6: ICD-10-CM

## 2017-10-27 DIAGNOSIS — I48.91: ICD-10-CM

## 2017-10-27 DIAGNOSIS — Z91.041: ICD-10-CM

## 2017-10-27 DIAGNOSIS — Z90.49: ICD-10-CM

## 2017-10-27 LAB
ALBUMIN SERPL-MCNC: 3.2 G/DL (ref 3.4–5)
ALBUMIN/GLOB SERPL: 1 {RATIO} (ref 1–1.7)
ALP SERPL-CCNC: 129 U/L (ref 46–116)
ALT SERPL-CCNC: 13 U/L (ref 14–59)
ANION GAP SERPL CALC-SCNC: 9 MMOL/L (ref 6–14)
AST SERPL-CCNC: 10 U/L (ref 15–37)
BASOPHILS # BLD AUTO: 0.1 X10^3/UL (ref 0–0.2)
BASOPHILS NFR BLD: 1 % (ref 0–3)
BILIRUB SERPL-MCNC: 0.3 MG/DL (ref 0.2–1)
BUN/CREAT SERPL: 17 (ref 6–20)
CA-I SERPL ISE-MCNC: 12 MG/DL (ref 7–20)
CALCIUM SERPL-MCNC: 8.5 MG/DL (ref 8.5–10.1)
CHLORIDE SERPL-SCNC: 103 MMOL/L (ref 98–107)
CO2 SERPL-SCNC: 29 MMOL/L (ref 21–32)
CREAT SERPL-MCNC: 0.7 MG/DL (ref 0.6–1)
EOSINOPHIL NFR BLD: 0.3 X10^3/UL (ref 0–0.7)
EOSINOPHIL NFR BLD: 4 % (ref 0–3)
ERYTHROCYTE [DISTWIDTH] IN BLOOD BY AUTOMATED COUNT: 17.2 % (ref 11.5–14.5)
GFR SERPLBLD BASED ON 1.73 SQ M-ARVRAT: 87.2 ML/MIN
GLOBULIN SER-MCNC: 3.3 G/DL (ref 2.2–3.8)
GLUCOSE SERPL-MCNC: 105 MG/DL (ref 70–99)
HCT VFR BLD CALC: 31.5 % (ref 36–47)
HGB BLD-MCNC: 10 G/DL (ref 12–15.5)
LIPASE: 75 U/L (ref 73–393)
LYMPHOCYTES # BLD: 2.2 X10^3/UL (ref 1–4.8)
LYMPHOCYTES NFR BLD AUTO: 30 % (ref 24–48)
MCH RBC QN AUTO: 26 PG (ref 25–35)
MCHC RBC AUTO-ENTMCNC: 32 G/DL (ref 31–37)
MCV RBC AUTO: 80 FL (ref 79–100)
MONO #: 0.4 X10^3/UL (ref 0–1.1)
MONOCYTES NFR BLD: 5 % (ref 0–9)
NEUT #: 4.3 X10^3UL (ref 1.8–7.7)
NEUTROPHILS NFR BLD AUTO: 60 % (ref 31–73)
PLATELET # BLD AUTO: 442 X10^3/UL (ref 140–400)
POTASSIUM SERPL-SCNC: 3.2 MMOL/L (ref 3.5–5.1)
PROT SERPL-MCNC: 6.5 G/DL (ref 6.4–8.2)
RBC # BLD AUTO: 3.91 X10^6/UL (ref 3.5–5.4)
SODIUM SERPL-SCNC: 141 MMOL/L (ref 136–145)
WBC # BLD AUTO: 7.3 X10^3/UL (ref 4–11)

## 2017-10-27 PROCEDURE — 96375 TX/PRO/DX INJ NEW DRUG ADDON: CPT

## 2017-10-27 PROCEDURE — 96374 THER/PROPH/DIAG INJ IV PUSH: CPT

## 2017-10-27 PROCEDURE — 96372 THER/PROPH/DIAG INJ SC/IM: CPT

## 2017-10-27 PROCEDURE — 36415 COLL VENOUS BLD VENIPUNCTURE: CPT

## 2017-10-27 PROCEDURE — 85025 COMPLETE CBC W/AUTO DIFF WBC: CPT

## 2017-10-27 PROCEDURE — 80053 COMPREHEN METABOLIC PANEL: CPT

## 2017-10-27 PROCEDURE — 74000: CPT

## 2017-10-27 PROCEDURE — 99285 EMERGENCY DEPT VISIT HI MDM: CPT

## 2017-10-27 PROCEDURE — 49451 REPLACE DUOD/JEJ TUBE PERC: CPT

## 2017-10-27 PROCEDURE — 83690 ASSAY OF LIPASE: CPT

## 2017-10-27 PROCEDURE — 74177 CT ABD & PELVIS W/CONTRAST: CPT

## 2017-10-27 NOTE — RAD
CT scan abdomen and pelvis with contrast 10/27/2017

 

CLINICAL HISTORY: Abdominal pain. Feeding tube dysfunction with new 

feeding tube placement.

 

TECHNIQUE: After the intravenous administration of 75 cc of Omnipaque 300,

contiguous, 5 mm axial sections were obtained through the abdomen and 

pelvis. 24 cc of Omnipaque 240 were injected through the patient's newly 

placed feeding tube prior to scanning.

 

One or more of the following individualized dose reduction techniques were

utilized for this study:

 

1. Automated exposure control.

2. Adjustment of the mA and/or kV according to patient size.

3. Use of iterative reconstruction technique.

 

 

FINDINGS: Comparison study is dated 10/27/2017.

 

Images through the lung bases demonstrate minimal dependent subsegmental 

atelectasis bilaterally.

 

The liver, spleen, pancreas, adrenal glands and kidneys are within normal 

limits.

 

The abdominal aorta tapers normally. Surgical clips are seen within the 

gallbladder fossa. No free fluid or free air is seen within the abdomen.

 

A gastrostomy tube is seen within the body/antrum of the stomach. A 

jejunostomy tube is seen with its tip extending to the proximal jejunum. 

Contrast is seen within the mid/distal jejunal loops. There is no evidence

of bowel obstruction. Moderate amount of stool is seen throughout the 

colon. No extravasation of contrast is seen.

 

Images through the pelvis demonstrate the urinary bladder distended with 

urine. A moderate amount of stool is seen within the sigmoid colon and 

rectum. Multiple diverticula are seen involving the sigmoid colon. No 

inflammatory changes are seen in the adjacent fat. No free fluid is noted.

The osseous structures are unchanged.

 

IMPRESSION: No acute abnormality is seen.

 

Electronically signed by: Jamey Easton MD (10/27/2017 6:53 PM) [facility]

## 2017-10-27 NOTE — RAD
AP radiograph of the abdomen 10/27/2017

 

CLINICAL HISTORY: Feeding tube placement.

 

An AP portable supine digital radiograph of the abdomen/pelvis was 

obtained after 24 cc of Omnipaque 240 were injected through the patient's 

gastrojejunostomy tube by the technologist.

 

Comparison is made to a CT scan of the abdomen dated 3/4/2017.

 

Surgical clips are seen within the right upper quadrant abdomen consistent

with a cholecystectomy. The balloon from a gastrostomy tube overlies the 

body/antrum of the stomach, unchanged. A tube which appears to represent a

gastrojejunostomy tube is seen extending to the proximal jejunum. Contrast

is seen within the mid/distal duodenum and proximal jejunum. No 

extravasation of contrast is seen. There is no evidence of bowel 

obstruction. A moderate amount of stool is seen throughout the colon. The 

osseous structures are unchanged.

 

IMPRESSION: The feeding tube appears to be within the proximal jejunum. 

Contrast is seen opacifying portions of the duodenum and proximal jejunum 

as outlined above. No extravasation of contrast is seen.

 

Electronically signed by: Jamey Easton MD (10/27/2017 6:12 PM) [facility]
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
## 2017-11-13 ENCOUNTER — HOSPITAL ENCOUNTER (EMERGENCY)
Dept: HOSPITAL 63 - ER | Age: 52
Discharge: HOME | End: 2017-11-13
Payer: SELF-PAY

## 2017-11-13 VITALS — SYSTOLIC BLOOD PRESSURE: 137 MMHG | DIASTOLIC BLOOD PRESSURE: 86 MMHG

## 2017-11-13 VITALS — WEIGHT: 130 LBS | BODY MASS INDEX: 27.29 KG/M2 | HEIGHT: 58 IN

## 2017-11-13 DIAGNOSIS — Z88.8: ICD-10-CM

## 2017-11-13 DIAGNOSIS — Z91.041: ICD-10-CM

## 2017-11-13 DIAGNOSIS — Z88.6: ICD-10-CM

## 2017-11-13 DIAGNOSIS — T85.898A: Primary | ICD-10-CM

## 2017-11-13 LAB
ANION GAP SERPL CALC-SCNC: 12 MMOL/L (ref 6–14)
CA-I SERPL ISE-MCNC: 16 MG/DL (ref 7–20)
CALCIUM SERPL-MCNC: 9.2 MG/DL (ref 8.5–10.1)
CHLORIDE SERPL-SCNC: 104 MMOL/L (ref 98–107)
CO2 SERPL-SCNC: 26 MMOL/L (ref 21–32)
CREAT SERPL-MCNC: 0.7 MG/DL (ref 0.6–1)
GFR SERPLBLD BASED ON 1.73 SQ M-ARVRAT: 87.2 ML/MIN
GLUCOSE SERPL-MCNC: 93 MG/DL (ref 70–99)
POTASSIUM SERPL-SCNC: 3.8 MMOL/L (ref 3.5–5.1)
SODIUM SERPL-SCNC: 142 MMOL/L (ref 136–145)

## 2017-11-13 PROCEDURE — 96372 THER/PROPH/DIAG INJ SC/IM: CPT

## 2017-11-13 PROCEDURE — 36415 COLL VENOUS BLD VENIPUNCTURE: CPT

## 2017-11-13 PROCEDURE — 96375 TX/PRO/DX INJ NEW DRUG ADDON: CPT

## 2017-11-13 PROCEDURE — 96374 THER/PROPH/DIAG INJ IV PUSH: CPT

## 2017-11-13 PROCEDURE — 99284 EMERGENCY DEPT VISIT MOD MDM: CPT

## 2017-11-13 PROCEDURE — 80048 BASIC METABOLIC PNL TOTAL CA: CPT

## 2017-11-13 NOTE — PHYS DOC
General


Chief Complaint:  NAUSEA/VOMITING/DIARRHEA


Stated Complaint:  FEEDING TUBE PROBLEMS/VOMITING


Time Seen by MD:  18:43


Source:  patient, old records


Exam Limitations:  no limitations


Problems:  





History of Present Illness


Initial Comments


Patient is a 52-year-old female with J-tube and gastrostomy tube reporting that 

her feeding tube has been clogged off for the past few days.


Patient's primary concern is she hasn't been able to take any of her pain 

medications. She's been seen here many times for feeding tube issues in the 

past and is aware that we do not have GI in-house. She follows a [facility] but did not 

contact them she tried putting meat tenderizer in the tube without any relief. 

No other complaints. Vital signs are stable no nausea vomiting


Timing/Duration:  other


Severity:  moderate


Modifying Factors:  worse with eating


Associated Symptoms:  other


Allergies:  


Coded Allergies:  


     trazodone (Verified  Allergy, Intermediate, 17)


     I S O L A T I O N *CONTACT* (Verified  Allergy, Unknown, 17)


 +MRSA blood 17


     tramadol (Verified  Adverse Reaction, Intermediate, HALLUCINATIONS, )


 HALLUCINATIONS





Past Medical History


Medical History:  other (anxiety, depression, hypertension, migraines)


Surgical History:  other (gastrostomy tube, jejunostomy tube, appendectomy, 

cholecystectomy,  section, hysterectomy,)





Social History


Smoker:  non-smoker


Alcohol:  none


Drugs:  none





Review of Systems


Constitutional:  denies chills, denies fever


Respiratory:  denies cough, denies shortness of breath


Cardiovascular:  denies chest pain, denies palpitations


Gastrointestinal:  see HPI


Genitourinary:  denies dysuria, denies frequency


Psychiatric/Neurological:  see HPI


Hematologic/Lymphatic:  denies blood clots, denies easy bleeding, denies easy 

bruising





Physical Exam


General Appearance:  WD/WN, no apparent distress


Ear, Nose, Throat:  hearing grossly normal, normal ENT inspection, normal 

pharynx


Neck:  non-tender, full range of motion, supple


Respiratory:  chest non-tender, lungs clear, normal breath sounds


Cardiovascular:  normal peripheral pulses, regular rate, rhythm


Gastrointestinal:  non tender, soft (tube sites appear to be normal the G-tube 

is clogged with solid debris)


Neurologic/Psychiatric:  CNs II-XII nml as tested, alert, oriented x 3





Orders, Labs, Meds


Labs unremarkable no nausea vomiting throughout the ED course. Analgesia 

achieved with IM Dilaudid.





Feeding tube clogged protocol obtained from pharmacy and Pancrease enzymes used 

to try to break down the clogged. Ultimately unsuccessful, patient advised she 

would need to follow-up with [facility] tomorrow. She expressed agreement and 

understanding.


Departure


Time of Disposition:  22:13


Disposition:  01 HOME, SELF-CARE


Diagnosis:  Feeding Tube Problem, Pain control


Condition:  GOOD


Patient Instructions:  Tube Feeding Considerations





Additional Instructions:  


Although your feeding tube is clogged, you remain hydrated and no emergent 

condition prevents you from being able to go home tonight.


Rx:  morphine sulfate 10/5 sol, 10 ml every 6 h as needed severe pain  #50ml.


Call your doctor at  upon office opening tomorrow to schedule ASAP feeding 

tube replacement.


Return to ED with new or changing symptoms.











GRAY BLAKELY DO 2017 22:16

## 2017-11-15 ENCOUNTER — HOSPITAL ENCOUNTER (EMERGENCY)
Dept: HOSPITAL 63 - ER | Age: 52
Discharge: ANOTHER HOSPITAL | End: 2017-11-15
Payer: SELF-PAY

## 2017-11-15 VITALS — WEIGHT: 293 LBS | HEIGHT: 58 IN | BODY MASS INDEX: 61.5 KG/M2

## 2017-11-15 VITALS — SYSTOLIC BLOOD PRESSURE: 112 MMHG | DIASTOLIC BLOOD PRESSURE: 74 MMHG

## 2017-11-15 DIAGNOSIS — R10.9: Primary | ICD-10-CM

## 2017-11-15 DIAGNOSIS — R53.1: ICD-10-CM

## 2017-11-15 LAB
ALBUMIN SERPL-MCNC: 3.4 G/DL (ref 3.4–5)
ALBUMIN/GLOB SERPL: 1 {RATIO} (ref 1–1.7)
ALP SERPL-CCNC: 124 U/L (ref 46–116)
ALT SERPL-CCNC: 17 U/L (ref 14–59)
ANION GAP SERPL CALC-SCNC: 10 MMOL/L (ref 6–14)
AST SERPL-CCNC: 11 U/L (ref 15–37)
BASOPHILS # BLD AUTO: 0 X10^3/UL (ref 0–0.2)
BASOPHILS NFR BLD: 1 % (ref 0–3)
BILIRUB SERPL-MCNC: 0.2 MG/DL (ref 0.2–1)
BUN/CREAT SERPL: 15 (ref 6–20)
CA-I SERPL ISE-MCNC: 12 MG/DL (ref 7–20)
CALCIUM SERPL-MCNC: 8.6 MG/DL (ref 8.5–10.1)
CHLORIDE SERPL-SCNC: 104 MMOL/L (ref 98–107)
CO2 SERPL-SCNC: 27 MMOL/L (ref 21–32)
CREAT SERPL-MCNC: 0.8 MG/DL (ref 0.6–1)
EOSINOPHIL NFR BLD: 0.3 X10^3/UL (ref 0–0.7)
EOSINOPHIL NFR BLD: 5 % (ref 0–3)
ERYTHROCYTE [DISTWIDTH] IN BLOOD BY AUTOMATED COUNT: 16.9 % (ref 11.5–14.5)
GFR SERPLBLD BASED ON 1.73 SQ M-ARVRAT: 74.7 ML/MIN
GLOBULIN SER-MCNC: 3.4 G/DL (ref 2.2–3.8)
GLUCOSE SERPL-MCNC: 100 MG/DL (ref 70–99)
HCT VFR BLD CALC: 34.2 % (ref 36–47)
HGB BLD-MCNC: 10.6 G/DL (ref 12–15.5)
LYMPHOCYTES # BLD: 2.8 X10^3/UL (ref 1–4.8)
LYMPHOCYTES NFR BLD AUTO: 42 % (ref 24–48)
MAGNESIUM SERPL-MCNC: 1.8 MG/DL (ref 1.8–2.4)
MCH RBC QN AUTO: 24 PG (ref 25–35)
MCHC RBC AUTO-ENTMCNC: 31 G/DL (ref 31–37)
MCV RBC AUTO: 79 FL (ref 79–100)
MONO #: 0.4 X10^3/UL (ref 0–1.1)
MONOCYTES NFR BLD: 6 % (ref 0–9)
NEUT #: 3.2 X10^3UL (ref 1.8–7.7)
NEUTROPHILS NFR BLD AUTO: 47 % (ref 31–73)
PLATELET # BLD AUTO: 367 X10^3/UL (ref 140–400)
POTASSIUM SERPL-SCNC: 3.1 MMOL/L (ref 3.5–5.1)
PROT SERPL-MCNC: 6.8 G/DL (ref 6.4–8.2)
RBC # BLD AUTO: 4.35 X10^6/UL (ref 3.5–5.4)
SODIUM SERPL-SCNC: 141 MMOL/L (ref 136–145)
WBC # BLD AUTO: 6.7 X10^3/UL (ref 4–11)

## 2017-11-15 PROCEDURE — 99285 EMERGENCY DEPT VISIT HI MDM: CPT

## 2017-11-15 PROCEDURE — 80053 COMPREHEN METABOLIC PANEL: CPT

## 2017-11-15 PROCEDURE — 85025 COMPLETE CBC W/AUTO DIFF WBC: CPT

## 2017-11-15 PROCEDURE — 83735 ASSAY OF MAGNESIUM: CPT

## 2017-11-15 PROCEDURE — 96374 THER/PROPH/DIAG INJ IV PUSH: CPT

## 2017-11-15 PROCEDURE — 36415 COLL VENOUS BLD VENIPUNCTURE: CPT

## 2017-12-27 ENCOUNTER — HOSPITAL ENCOUNTER (EMERGENCY)
Dept: HOSPITAL 63 - ER | Age: 52
Discharge: HOME | End: 2017-12-27
Payer: SELF-PAY

## 2017-12-27 VITALS — SYSTOLIC BLOOD PRESSURE: 124 MMHG | DIASTOLIC BLOOD PRESSURE: 71 MMHG

## 2017-12-27 VITALS — BODY MASS INDEX: 29.81 KG/M2 | HEIGHT: 58 IN | WEIGHT: 142 LBS

## 2017-12-27 DIAGNOSIS — Z88.6: ICD-10-CM

## 2017-12-27 DIAGNOSIS — Z88.8: ICD-10-CM

## 2017-12-27 DIAGNOSIS — I10: ICD-10-CM

## 2017-12-27 DIAGNOSIS — Z98.51: ICD-10-CM

## 2017-12-27 DIAGNOSIS — Z98.890: ICD-10-CM

## 2017-12-27 DIAGNOSIS — G89.29: ICD-10-CM

## 2017-12-27 DIAGNOSIS — Z91.041: ICD-10-CM

## 2017-12-27 DIAGNOSIS — Z90.49: ICD-10-CM

## 2017-12-27 DIAGNOSIS — T85.898A: Primary | ICD-10-CM

## 2017-12-27 LAB
ANION GAP SERPL CALC-SCNC: 10 MMOL/L (ref 6–14)
ANISOCYTOSIS BLD QL SMEAR: SLIGHT
BASOPHILS # BLD AUTO: 0.1 X10^3/UL (ref 0–0.2)
BASOPHILS NFR BLD: 1 % (ref 0–3)
CA-I SERPL ISE-MCNC: 18 MG/DL (ref 7–20)
CALCIUM SERPL-MCNC: 8.6 MG/DL (ref 8.5–10.1)
CHLORIDE SERPL-SCNC: 107 MMOL/L (ref 98–107)
CO2 SERPL-SCNC: 23 MMOL/L (ref 21–32)
CREAT SERPL-MCNC: 0.8 MG/DL (ref 0.6–1)
DACRYOCYTES BLD QL SMEAR: (no result)
EOSINOPHIL NFR BLD: 0.2 X10^3/UL (ref 0–0.7)
EOSINOPHIL NFR BLD: 3 % (ref 0–3)
ERYTHROCYTE [DISTWIDTH] IN BLOOD BY AUTOMATED COUNT: 17.1 % (ref 11.5–14.5)
GFR SERPLBLD BASED ON 1.73 SQ M-ARVRAT: 74.7 ML/MIN
GLUCOSE SERPL-MCNC: 89 MG/DL (ref 70–99)
HCT VFR BLD CALC: 34.5 % (ref 36–47)
HGB BLD-MCNC: 10.6 G/DL (ref 12–15.5)
HYPOCHROMIA BLD QL SMEAR: SLIGHT
LIPASE: 84 U/L (ref 73–393)
LYMPHOCYTES # BLD: 1.8 X10^3/UL (ref 1–4.8)
LYMPHOCYTES NFR BLD AUTO: 23 % (ref 24–48)
MCH RBC QN AUTO: 23 PG (ref 25–35)
MCHC RBC AUTO-ENTMCNC: 31 G/DL (ref 31–37)
MCV RBC AUTO: 75 FL (ref 79–100)
MONO #: 0.3 X10^3/UL (ref 0–1.1)
MONOCYTES NFR BLD: 4 % (ref 0–9)
NEUT #: 5.7 X10^3UL (ref 1.8–7.7)
NEUTROPHILS NFR BLD AUTO: 71 % (ref 31–73)
OVALOCYTES BLD QL SMEAR: (no result)
PLATELET # BLD AUTO: 347 X10^3/UL (ref 140–400)
PLATELET # BLD EST: ADEQUATE 10*3/UL
PLATELET CLUMP: PRESENT
POTASSIUM SERPL-SCNC: 3.9 MMOL/L (ref 3.5–5.1)
RBC # BLD AUTO: 4.64 X10^6/UL (ref 3.5–5.4)
SCHISTOCYTES BLD QL SMEAR: (no result)
SODIUM SERPL-SCNC: 140 MMOL/L (ref 136–145)
WBC # BLD AUTO: 8 X10^3/UL (ref 4–11)

## 2017-12-27 PROCEDURE — 99284 EMERGENCY DEPT VISIT MOD MDM: CPT

## 2017-12-27 PROCEDURE — 96360 HYDRATION IV INFUSION INIT: CPT

## 2017-12-27 PROCEDURE — 85025 COMPLETE CBC W/AUTO DIFF WBC: CPT

## 2017-12-27 PROCEDURE — 36415 COLL VENOUS BLD VENIPUNCTURE: CPT

## 2017-12-27 PROCEDURE — 96372 THER/PROPH/DIAG INJ SC/IM: CPT

## 2017-12-27 PROCEDURE — 80048 BASIC METABOLIC PNL TOTAL CA: CPT

## 2017-12-27 PROCEDURE — 83690 ASSAY OF LIPASE: CPT

## 2017-12-27 NOTE — PHYS DOC
General


Chief Complaint:  GTUBE REPLACEMENT/MALFUNCTION


Stated Complaint:  NA


Time Seen by MD:  11:15


Source:  patient, RN/MD


Exam Limitations:  no limitations


Problems:  





History of Present Illness


Initial Comments


Patient is a 52-year-old female who comes to the ED complaining of feeding tube 

problem.


Patient states that her feeding tube came out 3 days ago on December 24. She 

says that she called Dr. Bay at [facility] who has placed 

her on the schedule for replacement of her to tomorrow at 8 AM. She says that 

Dr. Bay advised her to come here and be admitted overnight as long as she was 

discharge in time to make it to her procedure tomorrow.





I called to speak with Dr. Bay he was in a procedure. I spoke with his nurse 

who advised that the patient did call earlier and spoke with a nurse she did 

not talk to any doctors. She was placed on the schedule for tomorrow and when 

she called she advised that she had some abdominal bloating vomiting and 

diarrhea. They recommended she be evaluated by her doctor to see if there is a 

secondary process.





Patient has no abdominal bloating vomiting or diarrhea in the emergency 

department.


Timing/Duration:  other


Modifying Factors:  improves with other


Associated Symptoms:  other


Allergies:  


Coded Allergies:  


     trazodone (Verified  Allergy, Intermediate, 11/13/17)


     I S O L A T I O N *CONTACT* (Verified  Allergy, Unknown, 11/13/17)


 +MRSA blood 8/16/17


     tramadol (Verified  Adverse Reaction, Intermediate, HALLUCINATIONS, 11/13/ 17)


 HALLUCINATIONS





Past Medical History


Medical History:  other (hypertension, pancreatitis, nausea vomiting, Liliya-

Calle tear, gastroparesis, chronic pain)


Surgical History:  other (gastrostomy tube, jejunostomy tube, Port-A-Cath, 

appendectomy, cholecystectomy, tubal ligation, sphincterotomy,)





Social History


Smoker:  non-smoker


Alcohol:  none


Drugs:  none





Review of Systems


Constitutional:  denies chills, denies diaphoresis, denies fever, denies malaise


Respiratory:  denies cough, denies shortness of breath


Cardiovascular:  denies chest pain, denies palpitations


Gastrointestinal:  see HPI


Genitourinary:  denies dysuria, denies frequency, denies hematuria


Psychiatric/Neurological:  denies headache, pre-existing deficit, denies seizure





Physical Exam


General Appearance:  no apparent distress


Ear, Nose, Throat:  hearing grossly normal, normal ENT inspection


Neck:  non-tender, supple


Respiratory:  normal breath sounds, no respiratory distress


Cardiovascular:  normal peripheral pulses, regular rate, rhythm


Gastrointestinal:  non tender, soft (G-tube ostomy has closed and there is mild 

surrounding erythema consistent with prior evaluations no discharge)


Neurologic/Psychiatric:  alert, oriented x 3


Skin:  normal color, warm/dry





Orders, Labs, Meds


The patient was advised that no apparent emergent condition existed and no 

indication for hospitalization. She is received a liter bolus of normal saline 

and has had reassuring labs, she was encouraged to be truthful with future 

emergency department visits. She expressed agreement and understanding.


Departure


Time of Disposition:  12:45


Disposition:  01 HOME, SELF-CARE


Diagnosis:  feeding tube problem, chronic pain


Condition:  GOOD


Patient Instructions:  Tube Feeding Considerations





Additional Instructions:  


Continue current treatment.


Follow-up at [facility] tomorrow for your feeding tube replacement as 

directed.


Return to ED with new or changing symptoms.











GRAY BLAKELY DO Dec 27, 2017 12:40

## 2018-01-11 ENCOUNTER — HOSPITAL ENCOUNTER (INPATIENT)
Dept: HOSPITAL 63 - ER | Age: 53
LOS: 1 days | Discharge: HOME | DRG: 92 | End: 2018-01-12
Attending: FAMILY MEDICINE | Admitting: FAMILY MEDICINE
Payer: SELF-PAY

## 2018-01-11 VITALS — WEIGHT: 174.5 LBS | BODY MASS INDEX: 36.63 KG/M2 | HEIGHT: 58 IN

## 2018-01-11 VITALS — DIASTOLIC BLOOD PRESSURE: 60 MMHG | SYSTOLIC BLOOD PRESSURE: 115 MMHG

## 2018-01-11 VITALS — SYSTOLIC BLOOD PRESSURE: 99 MMHG | DIASTOLIC BLOOD PRESSURE: 53 MMHG

## 2018-01-11 VITALS — DIASTOLIC BLOOD PRESSURE: 70 MMHG | SYSTOLIC BLOOD PRESSURE: 155 MMHG

## 2018-01-11 VITALS — SYSTOLIC BLOOD PRESSURE: 97 MMHG | DIASTOLIC BLOOD PRESSURE: 68 MMHG

## 2018-01-11 VITALS — SYSTOLIC BLOOD PRESSURE: 112 MMHG | DIASTOLIC BLOOD PRESSURE: 60 MMHG

## 2018-01-11 VITALS — DIASTOLIC BLOOD PRESSURE: 54 MMHG | SYSTOLIC BLOOD PRESSURE: 99 MMHG

## 2018-01-11 VITALS — DIASTOLIC BLOOD PRESSURE: 69 MMHG | SYSTOLIC BLOOD PRESSURE: 154 MMHG

## 2018-01-11 VITALS — SYSTOLIC BLOOD PRESSURE: 116 MMHG | DIASTOLIC BLOOD PRESSURE: 58 MMHG

## 2018-01-11 VITALS — DIASTOLIC BLOOD PRESSURE: 65 MMHG | SYSTOLIC BLOOD PRESSURE: 133 MMHG

## 2018-01-11 VITALS — DIASTOLIC BLOOD PRESSURE: 62 MMHG | SYSTOLIC BLOOD PRESSURE: 111 MMHG

## 2018-01-11 VITALS — SYSTOLIC BLOOD PRESSURE: 99 MMHG | DIASTOLIC BLOOD PRESSURE: 54 MMHG

## 2018-01-11 VITALS — DIASTOLIC BLOOD PRESSURE: 67 MMHG | SYSTOLIC BLOOD PRESSURE: 117 MMHG

## 2018-01-11 VITALS — DIASTOLIC BLOOD PRESSURE: 65 MMHG | SYSTOLIC BLOOD PRESSURE: 126 MMHG

## 2018-01-11 VITALS — SYSTOLIC BLOOD PRESSURE: 123 MMHG | DIASTOLIC BLOOD PRESSURE: 67 MMHG

## 2018-01-11 VITALS — DIASTOLIC BLOOD PRESSURE: 59 MMHG | SYSTOLIC BLOOD PRESSURE: 105 MMHG

## 2018-01-11 VITALS — SYSTOLIC BLOOD PRESSURE: 155 MMHG | DIASTOLIC BLOOD PRESSURE: 70 MMHG

## 2018-01-11 VITALS — DIASTOLIC BLOOD PRESSURE: 59 MMHG | SYSTOLIC BLOOD PRESSURE: 99 MMHG

## 2018-01-11 DIAGNOSIS — G43.909: ICD-10-CM

## 2018-01-11 DIAGNOSIS — G89.29: ICD-10-CM

## 2018-01-11 DIAGNOSIS — F32.9: ICD-10-CM

## 2018-01-11 DIAGNOSIS — I48.91: ICD-10-CM

## 2018-01-11 DIAGNOSIS — Z90.49: ICD-10-CM

## 2018-01-11 DIAGNOSIS — G47.00: ICD-10-CM

## 2018-01-11 DIAGNOSIS — F41.9: ICD-10-CM
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
## 2018-01-11 DIAGNOSIS — Z88.8: ICD-10-CM

## 2018-01-11 DIAGNOSIS — I10: ICD-10-CM

## 2018-01-11 DIAGNOSIS — Z79.899: ICD-10-CM

## 2018-01-11 DIAGNOSIS — Z90.710: ICD-10-CM

## 2018-01-11 DIAGNOSIS — G92: Primary | ICD-10-CM

## 2018-01-11 DIAGNOSIS — T50.905A: ICD-10-CM

## 2018-01-11 DIAGNOSIS — S37.23XA: ICD-10-CM
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
## 2018-01-11 LAB
ALBUMIN SERPL-MCNC: 2.9 G/DL (ref 3.4–5)
ALBUMIN/GLOB SERPL: 0.8 {RATIO} (ref 1–1.7)
ALP SERPL-CCNC: 99 U/L (ref 46–116)
ALT SERPL-CCNC: 18 U/L (ref 14–59)
AMPHETAMINE/METHAMPHETAMINE: (no result)
ANION GAP SERPL CALC-SCNC: 11 MMOL/L (ref 6–14)
APTT PPP: YELLOW S
AST SERPL-CCNC: 26 U/L (ref 15–37)
BACTERIA #/AREA URNS HPF: 0 /HPF
BARBITURATES UR-MCNC: (no result) UG/ML
BASOPHILS # BLD AUTO: 0 X10^3/UL (ref 0–0.2)
BASOPHILS NFR BLD: 0 % (ref 0–3)
BENZODIAZ UR-MCNC: (no result) UG/L
BILIRUB SERPL-MCNC: 0.2 MG/DL (ref 0.2–1)
BILIRUB UR QL STRIP: (no result)
BUN/CREAT SERPL: 8 (ref 6–20)
CA-I SERPL ISE-MCNC: 8 MG/DL (ref 7–20)
CALCIUM SERPL-MCNC: 8.7 MG/DL (ref 8.5–10.1)
CANNABINOIDS UR-MCNC: (no result) UG/L
CHLORIDE SERPL-SCNC: 103 MMOL/L (ref 98–107)
CO2 SERPL-SCNC: 28 MMOL/L (ref 21–32)
COCAINE UR-MCNC: (no result) NG/ML
CREAT SERPL-MCNC: 1 MG/DL (ref 0.6–1)
EOSINOPHIL NFR BLD: 0.2 X10^3/UL (ref 0–0.7)
EOSINOPHIL NFR BLD: 3 % (ref 0–3)
ERYTHROCYTE [DISTWIDTH] IN BLOOD BY AUTOMATED COUNT: 18.3 % (ref 11.5–14.5)
FIBRINOGEN PPP-MCNC: (no result) MG/DL
GFR SERPLBLD BASED ON 1.73 SQ M-ARVRAT: 57.8 ML/MIN
GLOBULIN SER-MCNC: 3.5 G/DL (ref 2.2–3.8)
GLUCOSE SERPL-MCNC: 196 MG/DL (ref 70–99)
GLUCOSE UR STRIP-MCNC: (no result) MG/DL
HCT VFR BLD CALC: 30.2 % (ref 36–47)
HGB BLD-MCNC: 9.6 G/DL (ref 12–15.5)
LIPASE: 40 U/L (ref 73–393)
LYMPHOCYTES # BLD: 1.4 X10^3/UL (ref 1–4.8)
LYMPHOCYTES NFR BLD AUTO: 18 % (ref 24–48)
MCH RBC QN AUTO: 23 PG (ref 25–35)
MCHC RBC AUTO-ENTMCNC: 32 G/DL (ref 31–37)
MCV RBC AUTO: 74 FL (ref 79–100)
METHADONE SERPL-MCNC: (no result) NG/ML
MONO #: 0.3 X10^3/UL (ref 0–1.1)
MONOCYTES NFR BLD: 3 % (ref 0–9)
NEUT #: 5.8 X10^3UL (ref 1.8–7.7)
NEUTROPHILS NFR BLD AUTO: 76 % (ref 31–73)
NITRITE UR QL STRIP: (no result)
OPIATES UR-MCNC: (no result) NG/ML
PCP SERPL-MCNC: (no result) MG/DL
PLATELET # BLD AUTO: 364 X10^3/UL (ref 140–400)
POTASSIUM SERPL-SCNC: 3.4 MMOL/L (ref 3.5–5.1)
PROT SERPL-MCNC: 6.4 G/DL (ref 6.4–8.2)
RBC # BLD AUTO: 4.09 X10^6/UL (ref 3.5–5.4)
RBC #/AREA URNS HPF: (no result) /HPF (ref 0–2)
SODIUM SERPL-SCNC: 142 MMOL/L (ref 136–145)
SP GR UR STRIP: 1.01
SQUAMOUS #/AREA URNS LPF: (no result) /LPF
UROBILINOGEN UR-MCNC: 0.2 MG/DL
WBC # BLD AUTO: 7.7 X10^3/UL (ref 4–11)
WBC #/AREA URNS HPF: (no result) /HPF (ref 0–4)

## 2018-01-11 PROCEDURE — 80053 COMPREHEN METABOLIC PANEL: CPT

## 2018-01-11 PROCEDURE — 93005 ELECTROCARDIOGRAM TRACING: CPT

## 2018-01-11 PROCEDURE — G0479 DRUG TEST PRESUMP NOT OPT: HCPCS

## 2018-01-11 PROCEDURE — 80048 BASIC METABOLIC PNL TOTAL CA: CPT

## 2018-01-11 PROCEDURE — 85025 COMPLETE CBC W/AUTO DIFF WBC: CPT

## 2018-01-11 PROCEDURE — 83690 ASSAY OF LIPASE: CPT

## 2018-01-11 PROCEDURE — 36415 COLL VENOUS BLD VENIPUNCTURE: CPT

## 2018-01-11 PROCEDURE — 80307 DRUG TEST PRSMV CHEM ANLYZR: CPT

## 2018-01-11 PROCEDURE — 81001 URINALYSIS AUTO W/SCOPE: CPT

## 2018-01-11 PROCEDURE — 87641 MR-STAPH DNA AMP PROBE: CPT

## 2018-01-11 PROCEDURE — 83735 ASSAY OF MAGNESIUM: CPT

## 2018-01-11 RX ADMIN — OXYCODONE HYDROCHLORIDE SCH MG: 20 TABLET, FILM COATED, EXTENDED RELEASE ORAL at 18:19

## 2018-01-11 RX ADMIN — GABAPENTIN SCH MG: 250 SOLUTION ORAL at 21:28

## 2018-01-11 RX ADMIN — TIZANIDINE SCH MG: 4 TABLET ORAL at 14:00

## 2018-01-11 RX ADMIN — ONDANSETRON SCH MG: 4 TABLET, ORALLY DISINTEGRATING ORAL at 21:27

## 2018-01-11 RX ADMIN — GABAPENTIN SCH MG: 250 SOLUTION ORAL at 15:07

## 2018-01-11 RX ADMIN — TIZANIDINE SCH MG: 4 TABLET ORAL at 21:27

## 2018-01-11 RX ADMIN — ONDANSETRON SCH MG: 4 TABLET, ORALLY DISINTEGRATING ORAL at 15:05

## 2018-01-11 NOTE — EKG
[facility] [address], [place] [address]

Test Date:    2018               Test Time:    08:37:58

Pat Name:     ALBERTA SANCHEZ              Department:   

Patient ID:   [id number]           Room:          

Gender:       F                        Technician:   GILMA

:          1965               Requested By: RONNIE SOTOMAYOR

Order Number: [id number]            Reading MD:   Abdoulaye Rincon MD

                                 Measurements

Intervals                              Axis          

Rate:         63                       P:            51

VA:           150                      QRS:          56

QRSD:         90                       T:            56

QT:           468                                    

QTc:          482                                    

                           Interpretive Statements

SINUS RHYTHM



Electronically Signed On 2018 13:26:41 CST by Abdoulaye Rincon MD

## 2018-01-11 NOTE — PHYS DOC
Past History


Past Medical History:  A-Fib, Anemia, Anxiety, Depression, Hypertension, 

Migraines, Other


Past Surgical History:  Appendectomy, Cholecystectomy, , Hysterectomy, 

Other


Alcohol Use:  None


Drug Use:  None





Adult General


Chief Complaint


Chief Complaint:  OVERDOSE





HPI


HPI





Patient is a 52 year old F who presents with altered mental status. Her history 

was limited due to her current medical condition. Her history comes from 

herself and EMS. EMS was called by her son due to altered mental status. Upon 

arrival responsive only to painful stimulus. After 2 mg of Narcan intranasally 

her GCS was 15 and she became mildly agitated. She continued to have low blood 

pressure. She denies any new medical problems. She did have her G-tube replaced 

recently. The history surrounding this is unclear however review of previous ER 

documents shows that her G-tube was malfunctioning in late 2017. She 

denies any new symptoms including pain, shortness of breath, rash, urinary 

difficulty and any other symptoms. She denies intentionally overdosing on her 

pain medication and to her knowledge she does not feel that she has 

accidentally taken more than her prescribed medication





Review of Systems


Review of Systems





Constitutional: Denies fever or chills []


Eyes: Denies change in visual acuity, redness, or eye pain []


HENT: Denies nasal congestion or sore throat []


Respiratory: Denies cough or shortness of breath []


Cardiovascular: No additional information not addressed in HPI []


GI: Denies abdominal pain, nausea, vomiting, bloody stools or diarrhea []


: Denies dysuria or hematuria []


Musculoskeletal: Denies pain other than chronic pain


Integument: Denies rash or skin lesions []


Neurologic: Denies headache, focal weakness or sensory changes []


Endocrine: Denies polyuria or polydipsia []





All other systems were reviewed and found to be within normal limits, except as 

documented in this note.





Family History


Family History


No pertinent family medical history was reported





Current Medications


Current Medications


Current medications were reviewed


Current Medications








 Medications


  (Trade)  Dose


 Ordered  Sig/Kari  Start Time


 Stop Time Status Last Admin


Dose Admin


 


 Sodium Chloride  1,000 ml @ 


 1,000 mls/hr  Q1H  18 08:28


 18 09:27 UNV  


 











Allergies


Allergies





Allergies








Coded Allergies Type Severity Reaction Last Updated Verified


 


  trazodone Allergy Intermediate  17 Yes


 


  I S O L A T I O N *CONTACT* Allergy Unknown  17 Yes


 


  tramadol Adverse Reaction Intermediate HALLUCINATIONS 17 Yes











Physical Exam


Physical Exam





Constitutional: Well developed, well nourished, no acute distress, non-toxic 

appearance. []  Anxious appearing and tremulous


HENT: Normocephalic, atraumatic, 


Eyes: PERRLA, EOMI, conjunctiva normal, no discharge. [] 


Neck: Normal range of motion, no tenderness, supple, no stridor. [] 


Cardiovascular:Heart rate regular rhythm


Lungs & Thorax:  Bilateral breath sounds clear to auscultation []


Abdomen: Bowel sounds normal, soft, no tenderness, no masses, no pulsatile 

masses. [] 2 tubes in place but not secured with sutures. Minimal drainage 

noted with mild surrounding granulated tissue that appears mildly erythematous


Skin: Warm, dry, no erythema, 


Back: No tenderness, no CVA tenderness. [] 


Extremities: No tenderness, no cyanosis, no clubbing, ROM intact, no edema. [] 


Neurologic: Alert and oriented person and place. However she is not oriented to 

time stating that it is 1978, normal motor function, normal sensory 

function, no focal deficits noted. []


Psychologic: Affect normal, judgement normal, mood normal. []





Current Patient Data


Vital Signs





Vital Signs








  Date Time  Temp Pulse Resp B/P (MAP) Pulse Ox O2 Delivery O2 Flow Rate FiO2


 


18 10:30  53 20     


 


18 10:10  53 18  100   


 


18 09:55  57 20  100   


 


18 09:25  53 18  100   


 


18 09:10  57 18  100   


 


18 09:00  61 18  100   


 


18 08:40  63 20  100   


 


18 08:20 97.8 62 20  95 Nasal Cannula 2.0 








Lab Results





Laboratory Tests








Test


  18


08:30


 


White Blood Count


  7.7 x10^3/uL


(4.0-11.0)


 


Red Blood Count


  4.09 x10^6/uL


(3.50-5.40)


 


Hemoglobin


  9.6 g/dL


(12.0-15.5)


 


Hematocrit


  30.2 %


(36.0-47.0)


 


Mean Corpuscular Volume 74 fL () 


 


Mean Corpuscular Hemoglobin 23 pg (25-35) 


 


Mean Corpuscular Hemoglobin


Concent 32 g/dL


(31-37)


 


Red Cell Distribution Width


  18.3 %


(11.5-14.5)


 


Platelet Count


  364 x10^3/uL


(140-400)


 


Neutrophils (%) (Auto) 76 % (31-73) 


 


Lymphocytes (%) (Auto) 18 % (24-48) 


 


Monocytes (%) (Auto) 3 % (0-9) 


 


Eosinophils (%) (Auto) 3 % (0-3) 


 


Basophils (%) (Auto) 0 % (0-3) 


 


Neutrophils # (Auto)


  5.8 x10^3uL


(1.8-7.7)


 


Lymphocytes # (Auto)


  1.4 x10^3/uL


(1.0-4.8)


 


Monocytes # (Auto)


  0.3 x10^3/uL


(0.0-1.1)


 


Eosinophils # (Auto)


  0.2 x10^3/uL


(0.0-0.7)


 


Basophils # (Auto)


  0.0 x10^3/uL


(0.0-0.2)


 


Sodium Level


  142 mmol/L


(136-145)


 


Potassium Level


  3.4 mmol/L


(3.5-5.1)


 


Chloride Level


  103 mmol/L


()


 


Carbon Dioxide Level


  28 mmol/L


(21-32)


 


Anion Gap 11 (6-14) 


 


Blood Urea Nitrogen 8 mg/dL (7-20) 


 


Creatinine


  1.0 mg/dL


(0.6-1.0)


 


Estimated GFR


(Cockcroft-Gault) 57.8 


 


 


BUN/Creatinine Ratio 8 (6-20) 


 


Glucose Level


  196 mg/dL


(70-99)


 


Calcium Level


  8.7 mg/dL


(8.5-10.1)


 


Total Bilirubin


  0.2 mg/dL


(0.2-1.0)


 


Aspartate Amino Transf


(AST/SGOT) 26 U/L (15-37) 


 


 


Alanine Aminotransferase


(ALT/SGPT) 18 U/L (14-59) 


 


 


Alkaline Phosphatase


  99 U/L


()


 


Total Protein


  6.4 g/dL


(6.4-8.2)


 


Albumin


  2.9 g/dL


(3.4-5.0)


 


Albumin/Globulin Ratio 0.8 (1.0-1.7) 


 


Lipase


  40 U/L


()











EKG


EKG


[]





Radiology/Procedures


Radiology/Procedures


[]





Course & Med Decision Making


Course & Med Decision Making


Pertinent Labs and Imaging studies reviewed. (See chart for details)





Rukhsana was monitored in the emergency room for greater than 2 hours. She 

continued to be lethargic however she was abusable. Her GCS remained between 11-

14 during most of her stay.  She continued to maintain her oxygen saturation 

and other vital signs.  Her blood pressure was stable but lower normal.  She 

did receive an initially NS bolus followed by fluids at 100ml/hr.  When she was 

switched to maintenance fluids her pressure mildly decreased so her rate was 

increased.  At this time she is not stable for discharge.  She was admitted to 

the hospital for further management of her condition.





Dragon Disclaimer


Dragon Disclaimer


This electronic medical record was generated, in whole or in part, using a 

voice recognition dictation system.





Departure


Departure:


Impression:  


 Primary Impression:  


 Altered mental state


Disposition:   ADMITTED AS INPATIENT


Condition:  STABLE


Referrals:  


SOFIA ALLEN MD (PCP)





Problem Qualifiers








 Primary Impression:  


 Altered mental state


 Altered mental status type:  somnolence  Qualified Codes:  R40.0 - Somnolence








RONNIE SOTOMAYOR MD 2018 08:45

## 2018-01-12 VITALS — DIASTOLIC BLOOD PRESSURE: 46 MMHG | SYSTOLIC BLOOD PRESSURE: 100 MMHG

## 2018-01-12 VITALS — DIASTOLIC BLOOD PRESSURE: 82 MMHG | SYSTOLIC BLOOD PRESSURE: 177 MMHG

## 2018-01-12 VITALS — SYSTOLIC BLOOD PRESSURE: 168 MMHG | DIASTOLIC BLOOD PRESSURE: 83 MMHG

## 2018-01-12 VITALS — SYSTOLIC BLOOD PRESSURE: 104 MMHG | DIASTOLIC BLOOD PRESSURE: 59 MMHG

## 2018-01-12 VITALS — SYSTOLIC BLOOD PRESSURE: 91 MMHG | DIASTOLIC BLOOD PRESSURE: 46 MMHG

## 2018-01-12 VITALS — SYSTOLIC BLOOD PRESSURE: 133 MMHG | DIASTOLIC BLOOD PRESSURE: 70 MMHG

## 2018-01-12 VITALS — DIASTOLIC BLOOD PRESSURE: 72 MMHG | SYSTOLIC BLOOD PRESSURE: 146 MMHG

## 2018-01-12 VITALS — SYSTOLIC BLOOD PRESSURE: 147 MMHG | DIASTOLIC BLOOD PRESSURE: 74 MMHG

## 2018-01-12 VITALS — DIASTOLIC BLOOD PRESSURE: 76 MMHG | SYSTOLIC BLOOD PRESSURE: 155 MMHG

## 2018-01-12 VITALS — DIASTOLIC BLOOD PRESSURE: 81 MMHG | SYSTOLIC BLOOD PRESSURE: 151 MMHG

## 2018-01-12 LAB
ANION GAP SERPL CALC-SCNC: 8 MMOL/L (ref 6–14)
BASOPHILS # BLD AUTO: 0 X10^3/UL (ref 0–0.2)
BASOPHILS NFR BLD: 0 % (ref 0–3)
CA-I SERPL ISE-MCNC: 6 MG/DL (ref 7–20)
CALCIUM SERPL-MCNC: 8.5 MG/DL (ref 8.5–10.1)
CHLORIDE SERPL-SCNC: 106 MMOL/L (ref 98–107)
CO2 SERPL-SCNC: 29 MMOL/L (ref 21–32)
CREAT SERPL-MCNC: 0.8 MG/DL (ref 0.6–1)
EOSINOPHIL NFR BLD: 0.3 X10^3/UL (ref 0–0.7)
EOSINOPHIL NFR BLD: 4 % (ref 0–3)
ERYTHROCYTE [DISTWIDTH] IN BLOOD BY AUTOMATED COUNT: 18.7 % (ref 11.5–14.5)
GFR SERPLBLD BASED ON 1.73 SQ M-ARVRAT: 74.7 ML/MIN
GLUCOSE SERPL-MCNC: 82 MG/DL (ref 70–99)
HCT VFR BLD CALC: 29.4 % (ref 36–47)
HGB BLD-MCNC: 9.1 G/DL (ref 12–15.5)
LYMPHOCYTES # BLD: 2.1 X10^3/UL (ref 1–4.8)
LYMPHOCYTES NFR BLD AUTO: 34 % (ref 24–48)
MAGNESIUM SERPL-MCNC: 2.1 MG/DL (ref 1.8–2.4)
MCH RBC QN AUTO: 23 PG (ref 25–35)
MCHC RBC AUTO-ENTMCNC: 31 G/DL (ref 31–37)
MCV RBC AUTO: 74 FL (ref 79–100)
MONO #: 0.3 X10^3/UL (ref 0–1.1)
MONOCYTES NFR BLD: 5 % (ref 0–9)
NEUT #: 3.4 X10^3UL (ref 1.8–7.7)
NEUTROPHILS NFR BLD AUTO: 57 % (ref 31–73)
PLATELET # BLD AUTO: 353 X10^3/UL (ref 140–400)
POTASSIUM SERPL-SCNC: 3.6 MMOL/L (ref 3.5–5.1)
RBC # BLD AUTO: 3.96 X10^6/UL (ref 3.5–5.4)
SODIUM SERPL-SCNC: 143 MMOL/L (ref 136–145)
WBC # BLD AUTO: 6.1 X10^3/UL (ref 4–11)

## 2018-01-12 RX ADMIN — GABAPENTIN SCH MG: 250 SOLUTION ORAL at 08:21

## 2018-01-12 RX ADMIN — OXYCODONE HYDROCHLORIDE SCH MG: 20 TABLET, FILM COATED, EXTENDED RELEASE ORAL at 00:00

## 2018-01-12 RX ADMIN — ONDANSETRON SCH MG: 4 TABLET, ORALLY DISINTEGRATING ORAL at 14:35

## 2018-01-12 RX ADMIN — GABAPENTIN SCH MG: 250 SOLUTION ORAL at 14:38

## 2018-01-12 RX ADMIN — OXYCODONE HYDROCHLORIDE SCH MG: 20 TABLET, FILM COATED, EXTENDED RELEASE ORAL at 12:12

## 2018-01-12 RX ADMIN — TIZANIDINE SCH MG: 4 TABLET ORAL at 07:44

## 2018-01-12 RX ADMIN — OXYCODONE HYDROCHLORIDE SCH MG: 20 TABLET, FILM COATED, EXTENDED RELEASE ORAL at 02:43

## 2018-01-12 RX ADMIN — OXYCODONE HYDROCHLORIDE SCH MG: 20 TABLET, FILM COATED, EXTENDED RELEASE ORAL at 07:00

## 2018-01-12 RX ADMIN — ONDANSETRON SCH MG: 4 TABLET, ORALLY DISINTEGRATING ORAL at 05:58

## 2018-01-12 RX ADMIN — TIZANIDINE SCH MG: 4 TABLET ORAL at 14:37

## 2018-01-12 RX ADMIN — OXYCODONE HYDROCHLORIDE SCH MG: 20 TABLET, FILM COATED, EXTENDED RELEASE ORAL at 12:37

## 2018-01-12 NOTE — PDOC3
Discharge Summary


Visit Information


Date of Admission:  2018


Date of Discharge:  2018


Final Diagnosis


Problems


Medical Problems:


(1) Altered mental state


#2 metabolic encephalopathy secondary to polypharmacy of sedated medications 


#3 tear in the J-tubeincidental occurrence on the full on in the hospital


#4 chronic pain


#5 anxiety


#6 polypharmacy #7 G-tube and J-tube status


#8 multiple other medical problems not addressed during this hospitalization


Problems:  





Brief Hospital Course


Allergies





 Allergies








Coded Allergies Type Severity Reaction Last Updated Verified


 


  trazodone Allergy Intermediate  17 Yes


 


  I S O L A T I O N *CONTACT* Allergy Unknown  17 Yes


 


  tramadol Adverse Reaction Intermediate HALLUCINATIONS 17 Yes








Vital Signs





Vital Signs








  Date Time  Temp Pulse Resp B/P (MAP) Pulse Ox O2 Delivery O2 Flow Rate FiO2


 


18 14:38   12  95 Room Air  


 


18 11:11  76  155/76 (102)    


 


18 08:00 98.7       


 


18 11:59       2.0 








Lab Results





Laboratory Tests








Test


  18


08:30 18


09:00 18


12:10 18


05:45


 


White Blood Count


  7.7 x10^3/uL


(4.0-11.0) 


  


  6.1 x10^3/uL


(4.0-11.0)


 


Red Blood Count


  4.09 x10^6/uL


(3.50-5.40) 


  


  3.96 x10^6/uL


(3.50-5.40)


 


Hemoglobin


  9.6 g/dL


(12.0-15.5) 


  


  9.1 g/dL


(12.0-15.5)


 


Hematocrit


  30.2 %


(36.0-47.0) 


  


  29.4 %


(36.0-47.0)


 


Mean Corpuscular Volume 74 fL ()    74 fL () 


 


Mean Corpuscular Hemoglobin 23 pg (25-35)    23 pg (25-35) 


 


Mean Corpuscular Hemoglobin


Concent 32 g/dL


(31-37) 


  


  31 g/dL


(31-37)


 


Red Cell Distribution Width


  18.3 %


(11.5-14.5) 


  


  18.7 %


(11.5-14.5)


 


Platelet Count


  364 x10^3/uL


(140-400) 


  


  353 x10^3/uL


(140-400)


 


Neutrophils (%) (Auto) 76 % (31-73)    57 % (31-73) 


 


Lymphocytes (%) (Auto) 18 % (24-48)    34 % (24-48) 


 


Monocytes (%) (Auto) 3 % (0-9)    5 % (0-9) 


 


Eosinophils (%) (Auto) 3 % (0-3)    4 % (0-3) 


 


Basophils (%) (Auto) 0 % (0-3)    0 % (0-3) 


 


Neutrophils # (Auto)


  5.8 x10^3uL


(1.8-7.7) 


  


  3.4 x10^3uL


(1.8-7.7)


 


Lymphocytes # (Auto)


  1.4 x10^3/uL


(1.0-4.8) 


  


  2.1 x10^3/uL


(1.0-4.8)


 


Monocytes # (Auto)


  0.3 x10^3/uL


(0.0-1.1) 


  


  0.3 x10^3/uL


(0.0-1.1)


 


Eosinophils # (Auto)


  0.2 x10^3/uL


(0.0-0.7) 


  


  0.3 x10^3/uL


(0.0-0.7)


 


Basophils # (Auto)


  0.0 x10^3/uL


(0.0-0.2) 


  


  0.0 x10^3/uL


(0.0-0.2)


 


Sodium Level


  142 mmol/L


(136-145) 


  


  143 mmol/L


(136-145)


 


Potassium Level


  3.4 mmol/L


(3.5-5.1) 


  


  3.6 mmol/L


(3.5-5.1)


 


Chloride Level


  103 mmol/L


() 


  


  106 mmol/L


()


 


Carbon Dioxide Level


  28 mmol/L


(21-32) 


  


  29 mmol/L


(21-32)


 


Anion Gap 11 (6-14)    8 (6-14) 


 


Blood Urea Nitrogen 8 mg/dL (7-20)    6 mg/dL (7-20) 


 


Creatinine


  1.0 mg/dL


(0.6-1.0) 


  


  0.8 mg/dL


(0.6-1.0)


 


Estimated GFR


(Cockcroft-Gault) 57.8 


  


  


  74.7 


 


 


BUN/Creatinine Ratio 8 (6-20)    


 


Glucose Level


  196 mg/dL


(70-99) 


  


  82 mg/dL


(70-99)


 


Calcium Level


  8.7 mg/dL


(8.5-10.1) 


  


  8.5 mg/dL


(8.5-10.1)


 


Total Bilirubin


  0.2 mg/dL


(0.2-1.0) 


  


  


 


 


Aspartate Amino Transf


(AST/SGOT) 26 U/L (15-37) 


  


  


  


 


 


Alanine Aminotransferase


(ALT/SGPT) 18 U/L (14-59) 


  


  


  


 


 


Alkaline Phosphatase


  99 U/L


() 


  


  


 


 


Total Protein


  6.4 g/dL


(6.4-8.2) 


  


  


 


 


Albumin


  2.9 g/dL


(3.4-5.0) 


  


  


 


 


Albumin/Globulin Ratio 0.8 (1.0-1.7)    


 


Lipase


  40 U/L


() 


  


  


 


 


Urine Collection Type  U cath   


 


Urine Color  Yellow   


 


Urine Clarity  Hazy   


 


Urine pH  5.5   


 


Urine Specific Gravity  1.010   


 


Urine Protein


  


  Neg


(NEG-TRACE) 


  


 


 


Urine Glucose (UA)


  


  Neg mg/dL


(NEG) 


  


 


 


Urine Ketones (Stick)


  


  Neg mg/dL


(NEG) 


  


 


 


Urine Blood  Trace (NEG)   


 


Urine Nitrite  Neg (NEG)   


 


Urine Bilirubin  Neg (NEG)   


 


Urine Urobilinogen Dipstick


  


  0.2 mg/dL (0.2


mg/dL) 


  


 


 


Urine Leukocyte Esterase  Neg (NEG)   


 


Urine RBC  1-2 /HPF (0-2)   


 


Urine WBC


  


  Rare /HPF


(0-4) 


  


 


 


Urine Squamous Epithelial


Cells 


  Occ /LPF 


  


  


 


 


Urine Transitional Epithelial


Cells 


  Occ /LPF 


  


  


 


 


Urine Bacteria  0 /HPF (0-FEW)   


 


Urine Mucus  Slight /LPF   


 


Urine Opiates Screen  Pos (NEG)   


 


Urine Methadone Screen  Neg (NEG)   


 


Urine Barbiturates  Neg (NEG)   


 


Urine Phencyclidine Screen  Neg (NEG)   


 


Urine


Amphetamine/Methamphetamine 


  Neg (NEG) 


  


  


 


 


Urine Benzodiazepines Screen  Pos (NEG)   


 


Urine Cocaine Screen  Neg (NEG)   


 


Urine Cannabinoids Screen  Neg (NEG)   


 


Urine Ethyl Alcohol  Neg (NEG)   


 


Nasal Screen MRSA (PCR)


  


  


  Negative


(Negative) 


 


 


Magnesium Level


  


  


  


  2.1 mg/dL


(1.8-2.4)








Brief Hospital Course


Ms. Coelho  is a 52 old [sex] who presented with [ ]





Patient is a 52 year old F who presents with altered mental status. Her history 

was limited due to her current medical condition. Her history comes from 

herself and EMS. EMS was called by her son due to altered mental status. Upon 

arrival responsive only to painful stimulus. After 2 mg of Narcan intranasally 

her GCS was 15 and she became mildly agitated. She continued to have low blood 

pressure. She denies any new medical problems. She did have her G-tube replaced 

recently. The history surrounding this is unclear however review of previous ER 

documents shows that her G-tube was malfunctioning in late 2017. She 

denies any new symptoms including pain, shortness of breath, rash, urinary 

difficulty and any other symptoms. She denies intentionally overdosing on her 

pain medication and to her knowledge she does not feel that she has 

accidentally taken more than her prescribed medication. She was also 

hypotensive.  Her medications were held for a few doses and  she received IV 

fluids. She wa back to her baseline yesterday evening and was fully alert at 

discharge. Her Jtub unfortunately received a tear from medication crushing and 

this was repaired as best as possible until her appointment next week to have 

it replaced.








Past Medical History:  A-Fib, Anemia, Anxiety, Depression, Hypertension, 

Migraines, Other


Past Surgical History:  Appendectomy, Cholecystectomy, , Hysterectomy, 

Other, J tube and gastrostomy tube-is fed per J tube.


Alcohol Use:  None


Drug Use:  None





PE:  ALERT AT THE TIME OF EXAM, NO EVIDENCE OF OVERSEDATION


TONGUE MOIST, THROAT CLEAR


LUNGS CLEAR TO AUSCULTATION


CVRRR


ABDOMEN:TWO SITES j TUBE AND G TUBE-MILD ERYTHEMA AROUND THE  J TUBE


EXTREMNETIES WITHOUT EDEMA





Discharge Information


Condition at Discharge:  Improved, Stable


Disposition/Orders:  D/C to Home


Dischare Medications





Current Medications


Sodium Chloride 1,000 ml @  1,000 mls/hr Q1H IV  Last administered on 18at 

08:30;  Start 18 at 08:28;  Stop 18 at 09:27;  Status DC


Sodium Chloride 1,000 ml @  75 mls/hr 1X  ONCE IV  Last administered on at 10:45;  Start 18 at 10:45;  Stop 18 at 00:04;  Status DC


Clonazepam (KlonoPIN) 1 mg TID PEG  Last administered on 18at 14:37;  

Start 18 at 14:00


Duloxetine HCl (Cymbalta) 60 mg DAILY PO  Last administered on 18at 07:45;

  Start 18 at 09:00


Ibuprofen (Motrin) 400 mg PRN  Q6HRS PO ;  Start 18 at 13:15;  Stop  at 06:19;  Status DC


Ondansetron HCl (Zofran Odt) 4 mg Q8HRS JT  Last administered on 18at 14:35

;  Start 18 at 14:00


Oxycodone HCl (OxyCONTIN) 20 mg Q6HRS PO  Last administered on 18at 02:43;

  Start 18 at 18:00;  Stop 18 at 12:37;  Status DC


Potassium Chloride (KCl Oral Soln) 10 meq TID PEG  Last administered on at 14:38;  Start 18 at 14:00


Sumatriptan Succinate (Imitrex) 6 mg PRN BID  PRN SQ headache Last administered 

on 18at 22:20;  Start 18 at 13:15


Citalopram Hydrobromide (CeleXA) 40 mg DAILY PO  Last administered on 18at 

07:44;  Start 18 at 09:00


Gabapentin (Neurontin) 300 mg [id number] PO  Last administered on 18at 14:38;  

Start 18 at 14:00


Tizanidine HCl (Zanaflex) 2 mg TID PEG  Last administered on 18at 14:37;  

Start 18 at 14:00


Benzocaine (Ora-Jel Maximum) 1 jr PRN QID  PRN TP ORAL PAIN;  Start 18 at 

18:15


Zolpidem Tartrate (Ambien) 10 mg PRN QHS  PRN PO INSOMNIA Last administered on at 22:20;  Start 18 at 22:15


Ibuprofen (Motrin) 400 mg PRN Q6HRS  PRN PO PAIN Last administered on 18at 

06:21;  Start 18 at 06:30


Oxycodone HCl 20 mg Q6HRS PEG  Last administered on 18at 12:59;  Start  at 12:45


Heparin Sodium (Porcine) (Hep Lock Adult) 500 unit SHIFT PRN IV AFTER MEDS AND 

BLOOD DRAWS;  Start 18 at 15:00


Heparin Sodium (Porcine) (Hep Lock Adult) 500 unit STK-MED ONCE IV ;  Start  at 14:53;  Stop 18 at 14:54;  Status DC





Active Scripts


Active


Zofran Odt (Ondansetron) 4 Mg Tab.rapdis 1 Tab SL Q8HRS


Cymbalta (Duloxetine Hcl) 60 Mg Capsule.dr 60 Mg PO DAILY


Imitrex (Sumatriptan Succinate) 6 Mg/0.5 Ml Vial 6 Mg SQ PRN BID PRN


Reported


Zolpidem Tartrate 10 Mg Tablet 1 Tab PEG QHS


Tizanidine Hcl (Tizanidine HCl) 2 Mg Capsule 2 Mg PEG TID


Oxycontin (Oxycodone HCl) 20 Mg Tab.er.12h 20 Mg PEG Q6HRS


Celexa (Citalopram Hydrobromide) 40 Mg Tablet 40 Mg PEG DAILY


Potassium Chloride Oral Liquid (Potassium Chloride) 20 Meq/15 Ml Liquid 10 Meq 

PEG TID


Gabapentin Oral Solution (Gabapentin) 300 Mg/6 Ml Solution 300 Mg PO TID


Ibuprofen 400 Mg Tablet 1 Tab PEG PRN Q6HRS


Clonazepam 1 Mg Tablet 1 Mg PEG TID





Patient Instructions


Patient Instuctions


typwritten instructions given on mediMINERVA Reyna DO 2018 15:16

## 2018-01-28 ENCOUNTER — HOSPITAL ENCOUNTER (EMERGENCY)
Dept: HOSPITAL 63 - ER | Age: 53
Discharge: HOME | End: 2018-01-28
Payer: SELF-PAY

## 2018-01-28 VITALS
HEIGHT: 58 IN | SYSTOLIC BLOOD PRESSURE: 140 MMHG | BODY MASS INDEX: 33.37 KG/M2 | WEIGHT: 159 LBS | DIASTOLIC BLOOD PRESSURE: 76 MMHG

## 2018-01-28 DIAGNOSIS — Z88.8: ICD-10-CM

## 2018-01-28 DIAGNOSIS — Z76.5: ICD-10-CM

## 2018-01-28 DIAGNOSIS — Z93.1: ICD-10-CM

## 2018-01-28 DIAGNOSIS — F41.9: ICD-10-CM

## 2018-01-28 DIAGNOSIS — G43.909: ICD-10-CM

## 2018-01-28 DIAGNOSIS — Z88.6: ICD-10-CM

## 2018-01-28 DIAGNOSIS — Z86.2: ICD-10-CM

## 2018-01-28 DIAGNOSIS — R10.13: ICD-10-CM

## 2018-01-28 DIAGNOSIS — I48.91: ICD-10-CM

## 2018-01-28 DIAGNOSIS — Z98.51: ICD-10-CM

## 2018-01-28 DIAGNOSIS — Z98.890: ICD-10-CM

## 2018-01-28 DIAGNOSIS — Z90.710: ICD-10-CM

## 2018-01-28 DIAGNOSIS — F12.10: ICD-10-CM

## 2018-01-28 DIAGNOSIS — R11.2: Primary | ICD-10-CM

## 2018-01-28 DIAGNOSIS — F32.9: ICD-10-CM

## 2018-01-28 DIAGNOSIS — Z91.041: ICD-10-CM

## 2018-01-28 DIAGNOSIS — Z90.49: ICD-10-CM

## 2018-01-28 DIAGNOSIS — I10: ICD-10-CM

## 2018-01-28 LAB
ALBUMIN SERPL-MCNC: 3.4 G/DL (ref 3.4–5)
ALBUMIN/GLOB SERPL: 1 {RATIO} (ref 1–1.7)
ALP SERPL-CCNC: 150 U/L (ref 46–116)
ALT SERPL-CCNC: 18 U/L (ref 14–59)
AMPHETAMINE/METHAMPHETAMINE: (no result)
ANION GAP SERPL CALC-SCNC: 10 MMOL/L (ref 6–14)
APTT PPP: (no result) S
AST SERPL-CCNC: 13 U/L (ref 15–37)
BACTERIA #/AREA URNS HPF: 0 /HPF
BARBITURATES UR-MCNC: (no result) UG/ML
BASOPHILS # BLD AUTO: 0 X10^3/UL (ref 0–0.2)
BASOPHILS NFR BLD: 1 % (ref 0–3)
BENZODIAZ UR-MCNC: (no result) UG/L
BILIRUB SERPL-MCNC: 0.3 MG/DL (ref 0.2–1)
BILIRUB UR QL STRIP: (no result)
BUN/CREAT SERPL: 14 (ref 6–20)
CA-I SERPL ISE-MCNC: 13 MG/DL (ref 7–20)
CALCIUM SERPL-MCNC: 9 MG/DL (ref 8.5–10.1)
CANNABINOIDS UR-MCNC: (no result) UG/L
CHLORIDE SERPL-SCNC: 102 MMOL/L (ref 98–107)
CO2 SERPL-SCNC: 30 MMOL/L (ref 21–32)
COCAINE UR-MCNC: (no result) NG/ML
CREAT SERPL-MCNC: 0.9 MG/DL (ref 0.6–1)
EOSINOPHIL NFR BLD: 0 % (ref 0–3)
EOSINOPHIL NFR BLD: 0 X10^3/UL (ref 0–0.7)
ERYTHROCYTE [DISTWIDTH] IN BLOOD BY AUTOMATED COUNT: 19.4 % (ref 11.5–14.5)
FIBRINOGEN PPP-MCNC: CLEAR MG/DL
GFR SERPLBLD BASED ON 1.73 SQ M-ARVRAT: 65.2 ML/MIN
GLOBULIN SER-MCNC: 3.4 G/DL (ref 2.2–3.8)
GLUCOSE SERPL-MCNC: 150 MG/DL (ref 70–99)
GLUCOSE UR STRIP-MCNC: (no result) MG/DL
HCT VFR BLD CALC: 37.5 % (ref 36–47)
HGB BLD-MCNC: 11.6 G/DL (ref 12–15.5)
LIPASE: 50 U/L (ref 73–393)
LYMPHOCYTES # BLD: 0.9 X10^3/UL (ref 1–4.8)
LYMPHOCYTES NFR BLD AUTO: 15 % (ref 24–48)
MCH RBC QN AUTO: 23 PG (ref 25–35)
MCHC RBC AUTO-ENTMCNC: 31 G/DL (ref 31–37)
MCV RBC AUTO: 74 FL (ref 79–100)
METHADONE SERPL-MCNC: (no result) NG/ML
MONO #: 0.1 X10^3/UL (ref 0–1.1)
MONOCYTES NFR BLD: 2 % (ref 0–9)
NEUT #: 4.9 X10^3UL (ref 1.8–7.7)
NEUTROPHILS NFR BLD AUTO: 82 % (ref 31–73)
NITRITE UR QL STRIP: (no result)
OPIATES UR-MCNC: (no result) NG/ML
PCP SERPL-MCNC: (no result) MG/DL
PLATELET # BLD AUTO: 581 X10^3/UL (ref 140–400)
POTASSIUM SERPL-SCNC: 3.7 MMOL/L (ref 3.5–5.1)
PROT SERPL-MCNC: 6.8 G/DL (ref 6.4–8.2)
RBC # BLD AUTO: 5.05 X10^6/UL (ref 3.5–5.4)
RBC #/AREA URNS HPF: (no result) /HPF (ref 0–2)
SODIUM SERPL-SCNC: 142 MMOL/L (ref 136–145)
SP GR UR STRIP: 1.01
SQUAMOUS #/AREA URNS LPF: (no result) /LPF
UROBILINOGEN UR-MCNC: 0.2 MG/DL
WBC # BLD AUTO: 6 X10^3/UL (ref 4–11)
WBC #/AREA URNS HPF: (no result) /HPF (ref 0–4)

## 2018-01-28 PROCEDURE — 85610 PROTHROMBIN TIME: CPT

## 2018-01-28 PROCEDURE — 96375 TX/PRO/DX INJ NEW DRUG ADDON: CPT

## 2018-01-28 PROCEDURE — 99285 EMERGENCY DEPT VISIT HI MDM: CPT

## 2018-01-28 PROCEDURE — G0479 DRUG TEST PRESUMP NOT OPT: HCPCS

## 2018-01-28 PROCEDURE — 36415 COLL VENOUS BLD VENIPUNCTURE: CPT

## 2018-01-28 PROCEDURE — 83690 ASSAY OF LIPASE: CPT

## 2018-01-28 PROCEDURE — 80307 DRUG TEST PRSMV CHEM ANLYZR: CPT

## 2018-01-28 PROCEDURE — 80053 COMPREHEN METABOLIC PANEL: CPT

## 2018-01-28 PROCEDURE — 96361 HYDRATE IV INFUSION ADD-ON: CPT

## 2018-01-28 PROCEDURE — 85025 COMPLETE CBC W/AUTO DIFF WBC: CPT

## 2018-01-28 PROCEDURE — 96374 THER/PROPH/DIAG INJ IV PUSH: CPT

## 2018-01-28 PROCEDURE — 84484 ASSAY OF TROPONIN QUANT: CPT

## 2018-01-28 PROCEDURE — 81001 URINALYSIS AUTO W/SCOPE: CPT

## 2018-01-28 RX ADMIN — SODIUM CHLORIDE, PRESERVATIVE FREE PRN ML: 5 INJECTION INTRAVENOUS at 15:37

## 2018-01-28 RX ADMIN — SODIUM CHLORIDE, PRESERVATIVE FREE PRN ML: 5 INJECTION INTRAVENOUS at 14:31

## 2018-01-28 NOTE — PHYS DOC
Past History


Past Medical History:  A-Fib, Anemia, Anxiety, Depression, Hypertension, 

Migraines, Pancreatitis, Other


Past Surgical History:  Appendectomy, Cholecystectomy, , Hysterectomy, 

Tubal ligation, Other


Alcohol Use:  None


Drug Use:  None, Marijuana





Adult General


Chief Complaint


Chief Complaint:  NAUSEA/VOMITING/DIARRHEA





[facility]


HPI


52-year-old female patient with indwelling  J-tube and PEG tube and multiple 

medical problems and frequent emergency room visits and drug-seeking behavior 

brought in by family members because of nausea and vomiting and abdominal pain 

for the last 3 days. Patient states she vomited 30 times every day for the last 

3 days without having diarrhea or urinary problem or fever. Patient complaining 

of epigastric pain during episodes of vomiting as a sharp pain with radiation 

to her back and rated her pain 8/10. Patient states she was able to take PEG 

tube feeding and her home medication of Zofran and oxycodone.





Review of Systems


Review of Systems





Constitutional: Denies fever , reports chills


Eyes: Denies change in visual acuity, redness, or eye pain []


HENT: Denies nasal congestion or sore throat []


Respiratory: Denies cough or shortness of breath []


Cardiovascular: No additional information not addressed in HPI []


GI: Reports abdominal pain, nausea, vomiting, denies bloody stools or diarrhea [

]


: Denies dysuria or hematuria []


Musculoskeletal: Denies back pain or joint pain []


Integument: Denies rash or skin lesions []


Neurologic: Denies headache, focal weakness or sensory changes []


Endocrine: Denies polyuria or polydipsia []





All other systems were reviewed and found to be within normal limits, except as 

documented in this note.





Current Medications


Current Medications





Current Medications








 Medications


  (Trade)  Dose


 Ordered  Sig/Kari  Start Time


 Stop Time Status Last Admin


Dose Admin


 


 Ondansetron HCl


  (Zofran)  4 mg  1X  ONCE  18 14:00


 18 14:01 DC 18 14:30


4 MG


 


 Sodium Chloride


  (Normal Saline


 Flush)  10 ml  QSHIFT  PRN  18 13:30


    18 14:31


10 ML











Allergies


Allergies





Allergies








Coded Allergies Type Severity Reaction Last Updated Verified


 


  trazodone Allergy Intermediate  17 Yes


 


  I S O L A T I O N *CONTACT* Allergy Unknown  17 Yes


 


  tramadol Adverse Reaction Intermediate HALLUCINATIONS 17 Yes











Physical Exam


Physical Exam





Constitutional: Mild  distress, non-toxic appearance, anxious. []


HENT: Normocephalic, atraumatic, bilateral external ears normal, oropharynx 

moist, no oral exudates, nose normal. []


Eyes: PERRLA, EOMI, conjunctiva normal, no discharge. [] 


Neck: Normal range of motion, no tenderness, supple, no stridor. [] 


Cardiovascular:Heart rate regular rhythm, no murmur []


Lungs & Thorax:  Bilateral breath sounds clear to auscultation []


Abdomen: Bowel sounds normal, soft, no tenderness, no masses, no pulsatile 

masses,J tube and PEG tube in place without sign of infection, epigastric 

voluntary guarding. [] 


Skin: Warm, dry, no erythema, no rash. [] 


Back: No tenderness, no CVA tenderness. [] 


Extremities: No tenderness, no cyanosis, no clubbing, ROM intact, no edema. [] 


Neurologic: Alert and oriented X 3, normal motor function, normal sensory 

function, no focal deficits noted. []


Psychologic: Anxious





Current Patient Data


Vital Signs





 Vital Signs








  Date Time  Temp Pulse Resp B/P (MAP) Pulse Ox O2 Delivery O2 Flow Rate FiO2


 


18 12:54  115 18  100 Room Air  








Lab Results





 Laboratory Tests








Test


  18


14:00


 


White Blood Count


  6.0 x10^3/uL


(4.0-11.0)


 


Red Blood Count


  5.05 x10^6/uL


(3.50-5.40)


 


Hemoglobin


  11.6 g/dL


(12.0-15.5)  L


 


Hematocrit


  37.5 %


(36.0-47.0)


 


Mean Corpuscular Volume


  74 fL ()


L


 


Mean Corpuscular Hemoglobin


  23 pg (25-35)


L


 


Mean Corpuscular Hemoglobin


Concent 31 g/dL


(31-37)


 


Red Cell Distribution Width


  19.4 %


(11.5-14.5)  H


 


Platelet Count


  581 x10^3/uL


(140-400)  H


 


Neutrophils (%) (Auto) 82 % (31-73)  H


 


Lymphocytes (%) (Auto) 15 % (24-48)  L


 


Monocytes (%) (Auto) 2 % (0-9)  


 


Eosinophils (%) (Auto) 0 % (0-3)  


 


Basophils (%) (Auto) 1 % (0-3)  


 


Neutrophils # (Auto)


  4.9 x10^3uL


(1.8-7.7)


 


Lymphocytes # (Auto)


  0.9 x10^3/uL


(1.0-4.8)  L


 


Monocytes # (Auto)


  0.1 x10^3/uL


(0.0-1.1)


 


Eosinophils # (Auto)


  0.0 x10^3/uL


(0.0-0.7)


 


Basophils # (Auto)


  0.0 x10^3/uL


(0.0-0.2)


 


Prothrombin Time


  11.0 SEC


(9.4-11.4)


 


Prothrombin Time INR 1.1 (0.9-1.1)  


 


Sodium Level


  142 mmol/L


(136-145)


 


Potassium Level


  3.7 mmol/L


(3.5-5.1)


 


Chloride Level


  102 mmol/L


()


 


Carbon Dioxide Level


  30 mmol/L


(21-32)


 


Anion Gap 10 (6-14)  


 


Blood Urea Nitrogen


  13 mg/dL


(7-20)


 


Creatinine


  0.9 mg/dL


(0.6-1.0)


 


Estimated GFR


(Cockcroft-Gault) 65.2  


 


 


BUN/Creatinine Ratio 14 (6-20)  


 


Glucose Level


  150 mg/dL


(70-99)  H


 


Calcium Level


  9.0 mg/dL


(8.5-10.1)


 


Total Bilirubin


  0.3 mg/dL


(0.2-1.0)


 


Aspartate Amino Transferase


(AST) 13 U/L (15-37)


L


 


Alanine Aminotransferase (ALT)


  18 U/L (14-59)


 


 


Alkaline Phosphatase


  150 U/L


()  H


 


Troponin I Quantitative


  < 0.017 ng/mL


(0-0.055)


 


Total Protein


  6.8 g/dL


(6.4-8.2)


 


Albumin


  3.4 g/dL


(3.4-5.0)


 


Albumin/Globulin Ratio 1.0 (1.0-1.7)  


 


Lipase


  50 U/L


()  L











EKG


EKG


[]





Radiology/Procedures


Radiology/Procedures


[]





Course & Med Decision Making


Course & Med Decision Making


Pertinent Labs  studies reviewed. (See chart for details)


Evaluation of patient in ER showed 52-year-old female patient multiple medical 

problem and frequent emergency room visits and drug-seeking behavior. Patient 

complaining of very frequent episodes of nausea and vomiting for the last 3 

days. Patient did not have any episodes of vomiting while she was in ER. 

Patient treated with IV fluid and Zofran. Patient had unremarkable labs. Plan 

discharge patient home with diagnose of nausea and vomiting and drug-seeking 

behavior.





[]





Dragon Disclaimer


Dragon Disclaimer


This electronic medical record was generated, in whole or in part, using a 

voice recognition dictation system.





Departure


Departure:


Impression:  


 Primary Impression:  


 Nausea and vomiting


 Additional Impressions:  


 Abdominal pain


 S/P percutaneous endoscopic gastrostomy (PEG) tube placement


 Drug-seeking behavior


Disposition:   HOME, SELF-CARE (At 1548)


Condition:  STABLE


Referrals:  


SOFIA ALLEN MD (PCP)


Patient Instructions:  Abdominal Pain, Nausea and Vomiting





Additional Instructions:  


Continue home medication


Follow-up with your physician in 2 or 3 days


Return to emergency room if not getting better





Problem Qualifiers











MINERVA ROONEY MD 2018 15:34

## 2018-06-19 ENCOUNTER — HOSPITAL ENCOUNTER (INPATIENT)
Dept: HOSPITAL 63 - ER | Age: 53
LOS: 1 days | Discharge: ANOTHER HOSPITAL | DRG: 394 | End: 2018-06-20
Attending: INTERNAL MEDICINE | Admitting: INTERNAL MEDICINE
Payer: SELF-PAY

## 2018-06-19 VITALS — WEIGHT: 166 LBS | BODY MASS INDEX: 34.85 KG/M2 | HEIGHT: 58 IN

## 2018-06-19 DIAGNOSIS — Z79.899: ICD-10-CM

## 2018-06-19 DIAGNOSIS — G43.909: ICD-10-CM

## 2018-06-19 DIAGNOSIS — K86.1: ICD-10-CM

## 2018-06-19 DIAGNOSIS — F41.9: ICD-10-CM

## 2018-06-19 DIAGNOSIS — T50.901A: ICD-10-CM

## 2018-06-19 DIAGNOSIS — Z87.19: ICD-10-CM
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
## 2018-06-19 DIAGNOSIS — K94.23: Primary | ICD-10-CM

## 2018-06-19 DIAGNOSIS — G89.29: ICD-10-CM

## 2018-06-19 DIAGNOSIS — Z88.8: ICD-10-CM

## 2018-06-19 DIAGNOSIS — F32.9: ICD-10-CM

## 2018-06-19 DIAGNOSIS — E87.3: ICD-10-CM

## 2018-06-19 DIAGNOSIS — I10: ICD-10-CM

## 2018-06-19 DIAGNOSIS — Z90.722: ICD-10-CM

## 2018-06-19 DIAGNOSIS — Z90.710: ICD-10-CM

## 2018-06-19 DIAGNOSIS — D50.9: ICD-10-CM

## 2018-06-19 DIAGNOSIS — E87.6: ICD-10-CM

## 2018-06-19 DIAGNOSIS — Z93.4: ICD-10-CM

## 2018-06-19 DIAGNOSIS — K31.84: ICD-10-CM

## 2018-06-19 DIAGNOSIS — K59.00: ICD-10-CM
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
## 2018-06-19 DIAGNOSIS — I48.91: ICD-10-CM

## 2018-06-19 DIAGNOSIS — Z90.49: ICD-10-CM

## 2018-06-19 LAB
AMPHETAMINE/METHAMPHETAMINE: (no result)
APTT PPP: YELLOW S
BACTERIA #/AREA URNS HPF: 0 /HPF
BARBITURATES UR-MCNC: (no result) UG/ML
BENZODIAZ UR-MCNC: (no result) UG/L
BGAS PCO2: 18 MMHG (ref 35–45)
BGAS PH: 7.75 (ref 7.35–7.45)
BGAS PO2: 114 MMHG (ref 80–100)
BILIRUB UR QL STRIP: (no result)
CANNABINOIDS UR-MCNC: (no result) UG/L
COCAINE UR-MCNC: (no result) NG/ML
DELTA BASE BGAS: 6 MMOL/L (ref 0–3)
FIBRINOGEN PPP-MCNC: CLEAR MG/DL
GLUCOSE UR STRIP-MCNC: (no result) MG/DL
METHADONE SERPL-MCNC: (no result) NG/ML
NITRITE UR QL STRIP: (no result)
O2 SAT BGAS: 100 % (ref 92–99)
O2/TOTAL GAS SETTING VFR VENT: 21 %
OPIATES UR-MCNC: (no result) NG/ML
PCP SERPL-MCNC: (no result) MG/DL
RBC #/AREA URNS HPF: (no result) /HPF (ref 0–2)
SP GR UR STRIP: 1.01
SQUAMOUS #/AREA URNS LPF: (no result) /LPF
UROBILINOGEN UR-MCNC: 0.2 MG/DL
WBC #/AREA URNS HPF: (no result) /HPF (ref 0–4)

## 2018-06-19 PROCEDURE — 83880 ASSAY OF NATRIURETIC PEPTIDE: CPT

## 2018-06-19 PROCEDURE — 87040 BLOOD CULTURE FOR BACTERIA: CPT

## 2018-06-19 PROCEDURE — 82553 CREATINE MB FRACTION: CPT

## 2018-06-19 PROCEDURE — 81001 URINALYSIS AUTO W/SCOPE: CPT

## 2018-06-19 PROCEDURE — 83735 ASSAY OF MAGNESIUM: CPT

## 2018-06-19 PROCEDURE — 82150 ASSAY OF AMYLASE: CPT

## 2018-06-19 PROCEDURE — 93005 ELECTROCARDIOGRAM TRACING: CPT

## 2018-06-19 PROCEDURE — G0479 DRUG TEST PRESUMP NOT OPT: HCPCS

## 2018-06-19 PROCEDURE — 85730 THROMBOPLASTIN TIME PARTIAL: CPT

## 2018-06-19 PROCEDURE — 74022 RADEX COMPL AQT ABD SERIES: CPT

## 2018-06-19 PROCEDURE — 36600 WITHDRAWAL OF ARTERIAL BLOOD: CPT

## 2018-06-19 PROCEDURE — 85025 COMPLETE CBC W/AUTO DIFF WBC: CPT

## 2018-06-19 PROCEDURE — 80048 BASIC METABOLIC PNL TOTAL CA: CPT

## 2018-06-19 PROCEDURE — 84484 ASSAY OF TROPONIN QUANT: CPT

## 2018-06-19 PROCEDURE — 82140 ASSAY OF AMMONIA: CPT

## 2018-06-19 PROCEDURE — 80307 DRUG TEST PRSMV CHEM ANLYZR: CPT

## 2018-06-19 PROCEDURE — 82803 BLOOD GASES ANY COMBINATION: CPT

## 2018-06-19 PROCEDURE — 85610 PROTHROMBIN TIME: CPT

## 2018-06-19 PROCEDURE — 87641 MR-STAPH DNA AMP PROBE: CPT

## 2018-06-19 PROCEDURE — 83690 ASSAY OF LIPASE: CPT

## 2018-06-19 PROCEDURE — 70450 CT HEAD/BRAIN W/O DYE: CPT

## 2018-06-19 PROCEDURE — 36415 COLL VENOUS BLD VENIPUNCTURE: CPT

## 2018-06-19 NOTE — ED.ADGEN
Past History


Past Medical History:  A-Fib, Anemia, Anxiety, Depression, Hypertension, 

Migraines, Pancreatitis, Other


Past Surgical History:  Appendectomy, Cholecystectomy, , Hysterectomy, 

Tubal ligation, Other


Alcohol Use:  None


Drug Use:  None, Marijuana





Adult General


Chief Complaint


Chief Complaint


- Sedate- min. response to questions-





HPI


HPI





Patient is a 53 year old female who presents with above hx and complaints of 

mental status change felt to be over dosage of pain and sedative meds.  per.  

Pt.   he advised she had been taking extra meds all day.  Pt. extremely 

sedated on arrival.   Pt. has hx of Gastroparesis.   Pt. has had Gastric 

duodenal tubes placed.   No history of travel or specific ill contacts. No 

history of bad food intake.  Pt. hx. of chronic pain,  and  chronic abd. pain. 

  Pt. has hx of multiple abd. surgery appendectomy, cholecystectomy, 

hysterectomy, , gastric tubes.  Patient normally follows with Dr. Wise.   Has followed at [facility] for GI complaints.





Review of Systems


Review of Systems


 


Pt. is very poor historian- because of her sedated status





GI:  complaints of abdominal pain, []





Family History


Family History


Not currently available





Current Medications


Current Medications


See Nursing for home meds.





Allergies


Allergies





Allergies








Coded Allergies Type Severity Reaction Last Updated Verified


 


  trazodone Allergy Intermediate  17 Yes


 


  I S O L A T I O N *CONTACT* Allergy Unknown  17 Yes


 


  tramadol Adverse Reaction Intermediate HALLUCINATIONS 17 Yes











Physical Exam


Physical Exam





Constitutional: Marked sedation.  Only responds to noxious stimuli. Intoxicated 

or drugged in appearance. []


HENT: Normocephalic, atraumatic, bilateral external ears normal, oropharynx 

moist, no oral exudates, nose normal. []Very poor dentition


Eyes: PERRLA, EOMI, conjunctiva normal, no discharge. [] 


Neck: Normal range of motion, no tenderness, supple, no stridor. [] 


Cardiovascular:Heart rate regular rhythm, no murmur []


Lungs & Thorax:  Bilateral breath sounds wheezes and rhonchi on auscultation []


Abdomen: Bowel sounds decreased, distended, generalize tenderness, no masses, 

no pulsatile masses.  Gastric and J- tubes. Multiple old scars. Poor hygiene 

around gastric and jejunal tubes.  Hard stool in rectal vault. 


Skin: Warm, dry, no erythema, no rash. [] Poor turgor.


Back: No tenderness, no CVA tenderness. [] 


Extremities: No tenderness, no cyanosis, no clubbing, ROM intact, no edema. [] 


Neurologic: Very sedated,   moves all ext. with noxious stimuli,  cross reacts,

   GCS- 13.  DTR + 2 patella and brachial. 


Psychologic: Affect sedated, ,





Current Patient Data


Vital Signs





 Vital Signs








  Date Time  Temp Pulse Resp B/P (MAP) Pulse Ox O2 Delivery O2 Flow Rate FiO2


 


18 22:36 98.8 58 20  100   








Lab Results





 Laboratory Tests








Test


 18


22:50 18


22:58 18


23:40


 


Urine Collection Type U cath    


 


Urine Color Yellow    


 


Urine Clarity Clear    


 


Urine pH >8.5    


 


Urine Specific Gravity 1.010    


 


Urine Protein


 30 mg/dl


(NEG-TRACE) 


 





 


Urine Glucose (UA)


 Neg mg/dL


(NEG) 


 





 


Urine Ketones (Stick)


 15 mg/dL (NEG)


 


 





 


Urine Blood Trace (NEG)    


 


Urine Nitrite Neg (NEG)    


 


Urine Bilirubin Neg (NEG)    


 


Urine Urobilinogen Dipstick


 0.2 mg/dL (0.2


mg/dL) 


 





 


Urine Leukocyte Esterase Neg (NEG)    


 


Urine RBC


 Occ /HPF (0-2)


 


 





 


Urine WBC


 Occ /HPF (0-4)


 


 





 


Urine Squamous Epithelial


Cells Occ /LPF  


 


 





 


Urine Bacteria


 0 /HPF (0-FEW)


 


 





 


Urine Opiates Screen Neg (NEG)    


 


Urine Methadone Screen Neg (NEG)    


 


Urine Barbiturates Neg (NEG)    


 


Urine Phencyclidine Screen Neg (NEG)    


 


Urine


Amphetamine/Methamphetamine Neg (NEG)  


 


 





 


Urine Benzodiazepines Screen Neg (NEG)    


 


Urine Cocaine Screen Neg (NEG)    


 


Urine Cannabinoids Screen Neg (NEG)    


 


Urine Ethyl Alcohol Neg (NEG)    


 


Blood pH


 


 7.75


(7.35-7.45)  *H 





 


Blood Gas PCO2


 


 18 mmHg


(35-45)  *L 





 


Blood Gas PO2


 


 114 mmHg


()  H 





 


Blood Gas HCO3


 


 25 mmol/L


(22-26) 





 


Arterial Bld O2 Saturation


(Calc) 


 100 % (92-99)


H 





 


FiO2  21 %   


 


White Blood Count


 


 


 6.6 x10^3/uL


(4.0-11.0)


 


Red Blood Count


 


 


 4.79 x10^6/uL


(3.50-5.40)


 


Hemoglobin


 


 


 11.6 g/dL


(12.0-15.5)  L


 


Hematocrit


 


 


 36.3 %


(36.0-47.0)


 


Mean Corpuscular Volume


 


 


 76 fL ()


L


 


Mean Corpuscular Hemoglobin


 


 


 24 pg (25-35)


L


 


Mean Corpuscular Hemoglobin


Concent 


 


 32 g/dL


(31-37)


 


Red Cell Distribution Width


 


 


 19.8 %


(11.5-14.5)  H


 


Platelet Count


 


 


 347 x10^3/uL


(140-400)


 


Neutrophils (%) (Auto)   80 % (31-73)  H


 


Lymphocytes (%) (Auto)   12 % (24-48)  L


 


Monocytes (%) (Auto)   6 % (0-9)  


 


Eosinophils (%) (Auto)   0 % (0-3)  


 


Basophils (%) (Auto)   1 % (0-3)  


 


Neutrophils # (Auto)


 


 


 5.2 x10^3uL


(1.8-7.7)


 


Lymphocytes # (Auto)


 


 


 0.8 x10^3/uL


(1.0-4.8)  L


 


Monocytes # (Auto)


 


 


 0.4 x10^3/uL


(0.0-1.1)


 


Eosinophils # (Auto)


 


 


 0.0 x10^3/uL


(0.0-0.7)


 


Basophils # (Auto)


 


 


 0.1 x10^3/uL


(0.0-0.2)


 


Prothrombin Time


 


 


 10.4 SEC


(9.4-11.4)


 


Prothrombin Time INR   1.0 (0.9-1.1)  


 


PTT


 


 


 21 SEC (23-33)


L


 


Sodium Level


 


 


 144 mmol/L


(136-145)


 


Potassium Level


 


 


 3.4 mmol/L


(3.5-5.1)  L


 


Chloride Level


 


 


 104 mmol/L


()


 


Carbon Dioxide Level


 


 


 26 mmol/L


(21-32)


 


Anion Gap   14 (6-14)  


 


Blood Urea Nitrogen


 


 


 8 mg/dL (7-20)





 


Creatinine


 


 


 1.0 mg/dL


(0.6-1.0)


 


Estimated GFR


(Cockcroft-Gault) 


 


 57.6  





 


Glucose Level


 


 


 132 mg/dL


(70-99)  H


 


Calcium Level


 


 


 9.4 mg/dL


(8.5-10.1)


 


Magnesium Level


 


 


 1.8 mg/dL


(1.8-2.4)


 


Ammonia


 


 


 < 10 mcmol/L


(11-34)  L


 


Creatine Kinase


 


 


 34 U/L


()


 


Creatine Kinase MB (Mass)


 


 


 < 0.5 ng/mL


(0.0-3.6)


 


Creatine Kinase MB Relative


Index 


 


 1.5 % (0-4)  





 


Troponin I Quantitative


 


 


 < 0.017 ng/mL


(0-0.055)


 


NT-Pro-B-Type Natriuretic


Peptide 


 


 889 pg/mL


(0-124)  H


 


Amylase Level


 


 


 31 U/L


()


 


Lipase


 


 


 112 U/L


()


 


Ethyl Alcohol Level


 


 


 < 10 mg/dL


(0-10)











EKG


EKG


My interpretation of EKG shows sinus 80,  prolonged QT interval. []





Radiology/Procedures


Radiology/Procedures


My interpretation of chest and abdomen film shows no acute cardiopulmonary 

findings. Does have a port.  No free air under the diaphragm. Abdomen film 

shows diffuse stool throughout the colon. Gastric port and duodenal tube..  Old 

surgery clips.





My interpretation CT of head shows no shift, mass, edema, bleed, or fracture.  

Has atrophy consistent with small vessel disease. See formal report when 

available[]





Course & Med Decision Making


Course & Med Decision Making


Pertinent Labs and Imaging studies reviewed. (See chart for details).  

Discussed presentation, testing and tx. plan with Dr. Carter-  will admit for 

further eval. and tx.   





Pt. mental status has improved, significantly since when lst seen in ED.  Will 

now respond to questions, but still delayed. 





[]





Final Impression


Final Impression


1. Mental Status Change[]


2. Medication Over Dose


3. Abd. Pain


4. Constipation


5. Hx. of Gastroparesis


6. Alkalosis-


7. Anemia- microcytic hypochromic


8. Mild hypokalemia


9, Elevated BNP





Dragon Disclaimer


Dragon Disclaimer


This electronic medical record was generated, in whole or in part, using a 

voice recognition dictation system.











BALJIT JUNG MD 2018 22:49

## 2018-06-20 VITALS — DIASTOLIC BLOOD PRESSURE: 62 MMHG | SYSTOLIC BLOOD PRESSURE: 98 MMHG

## 2018-06-20 VITALS — DIASTOLIC BLOOD PRESSURE: 57 MMHG | SYSTOLIC BLOOD PRESSURE: 138 MMHG

## 2018-06-20 VITALS — SYSTOLIC BLOOD PRESSURE: 125 MMHG | DIASTOLIC BLOOD PRESSURE: 80 MMHG

## 2018-06-20 LAB
AMYLASE SERPL-CCNC: 31 U/L (ref 25–115)
ANION GAP SERPL CALC-SCNC: 14 MMOL/L (ref 6–14)
ANION GAP SERPL CALC-SCNC: 14 MMOL/L (ref 6–14)
BASOPHILS # BLD AUTO: 0 X10^3/UL (ref 0–0.2)
BASOPHILS # BLD AUTO: 0.1 X10^3/UL (ref 0–0.2)
BASOPHILS NFR BLD: 0 % (ref 0–3)
BASOPHILS NFR BLD: 1 % (ref 0–3)
CA-I SERPL ISE-MCNC: 8 MG/DL (ref 7–20)
CA-I SERPL ISE-MCNC: 8 MG/DL (ref 7–20)
CALCIUM SERPL-MCNC: 8.8 MG/DL (ref 8.5–10.1)
CALCIUM SERPL-MCNC: 9.4 MG/DL (ref 8.5–10.1)
CHLORIDE SERPL-SCNC: 104 MMOL/L (ref 98–107)
CHLORIDE SERPL-SCNC: 104 MMOL/L (ref 98–107)
CO2 SERPL-SCNC: 26 MMOL/L (ref 21–32)
CO2 SERPL-SCNC: 26 MMOL/L (ref 21–32)
CREAT SERPL-MCNC: 0.9 MG/DL (ref 0.6–1)
CREAT SERPL-MCNC: 1 MG/DL (ref 0.6–1)
EOSINOPHIL NFR BLD: 0 % (ref 0–3)
EOSINOPHIL NFR BLD: 0 % (ref 0–3)
EOSINOPHIL NFR BLD: 0 X10^3/UL (ref 0–0.7)
EOSINOPHIL NFR BLD: 0 X10^3/UL (ref 0–0.7)
ERYTHROCYTE [DISTWIDTH] IN BLOOD BY AUTOMATED COUNT: 19 % (ref 11.5–14.5)
ERYTHROCYTE [DISTWIDTH] IN BLOOD BY AUTOMATED COUNT: 19.8 % (ref 11.5–14.5)
GFR SERPLBLD BASED ON 1.73 SQ M-ARVRAT: 57.6 ML/MIN
GFR SERPLBLD BASED ON 1.73 SQ M-ARVRAT: 65 ML/MIN
GLUCOSE SERPL-MCNC: 129 MG/DL (ref 70–99)
GLUCOSE SERPL-MCNC: 132 MG/DL (ref 70–99)
HCT VFR BLD CALC: 35.2 % (ref 36–47)
HCT VFR BLD CALC: 36.3 % (ref 36–47)
HGB BLD-MCNC: 11.2 G/DL (ref 12–15.5)
HGB BLD-MCNC: 11.6 G/DL (ref 12–15.5)
LIPASE: 112 U/L (ref 73–393)
LYMPHOCYTES # BLD: 0.8 X10^3/UL (ref 1–4.8)
LYMPHOCYTES # BLD: 1.1 X10^3/UL (ref 1–4.8)
LYMPHOCYTES NFR BLD AUTO: 12 % (ref 24–48)
LYMPHOCYTES NFR BLD AUTO: 16 % (ref 24–48)
MAGNESIUM SERPL-MCNC: 1.8 MG/DL (ref 1.8–2.4)
MCH RBC QN AUTO: 24 PG (ref 25–35)
MCH RBC QN AUTO: 24 PG (ref 25–35)
MCHC RBC AUTO-ENTMCNC: 32 G/DL (ref 31–37)
MCHC RBC AUTO-ENTMCNC: 32 G/DL (ref 31–37)
MCV RBC AUTO: 76 FL (ref 79–100)
MCV RBC AUTO: 76 FL (ref 79–100)
MONO #: 0.4 X10^3/UL (ref 0–1.1)
MONO #: 0.4 X10^3/UL (ref 0–1.1)
MONOCYTES NFR BLD: 6 % (ref 0–9)
MONOCYTES NFR BLD: 6 % (ref 0–9)
NEUT #: 5.1 X10^3UL (ref 1.8–7.7)
NEUT #: 5.2 X10^3UL (ref 1.8–7.7)
NEUTROPHILS NFR BLD AUTO: 78 % (ref 31–73)
NEUTROPHILS NFR BLD AUTO: 80 % (ref 31–73)
PLATELET # BLD AUTO: 347 X10^3/UL (ref 140–400)
PLATELET # BLD AUTO: 358 X10^3/UL (ref 140–400)
POTASSIUM SERPL-SCNC: 3.4 MMOL/L (ref 3.5–5.1)
POTASSIUM SERPL-SCNC: 3.4 MMOL/L (ref 3.5–5.1)
RBC # BLD AUTO: 4.66 X10^6/UL (ref 3.5–5.4)
RBC # BLD AUTO: 4.79 X10^6/UL (ref 3.5–5.4)
SODIUM SERPL-SCNC: 144 MMOL/L (ref 136–145)
SODIUM SERPL-SCNC: 144 MMOL/L (ref 136–145)
WBC # BLD AUTO: 6.6 X10^3/UL (ref 4–11)
WBC # BLD AUTO: 6.6 X10^3/UL (ref 4–11)

## 2018-06-20 RX ADMIN — SODIUM CHLORIDE, SODIUM LACTATE, POTASSIUM CHLORIDE, AND CALCIUM CHLORIDE SCH MLS/HR: .6; .31; .03; .02 INJECTION, SOLUTION INTRAVENOUS at 03:57

## 2018-06-20 RX ADMIN — SODIUM CHLORIDE, SODIUM LACTATE, POTASSIUM CHLORIDE, AND CALCIUM CHLORIDE SCH MLS/HR: .6; .31; .03; .02 INJECTION, SOLUTION INTRAVENOUS at 07:45

## 2018-06-20 NOTE — EKG
[facility] [address], [place] [address]

Test Date:    2018               Test Time:    22:54:09

Pat Name:     ALBERTA SANCHEZ              Department:   

Patient ID:   [id number]           Room:         107 A

Gender:       F                        Technician:   

:          [id number]               Requested By: BALJIT JUNG

Order Number: [id number]            Reading MD:   Abdoulaye Rincon MD

                                 Measurements

Intervals                              Axis          

Rate:         80                       P:            0

FL:           120                      QRS:          20

QRSD:         70                       T:            34

QT:           424                                    

QTc:          493                                    

                           Interpretive Statements

SINUS RHYTHM

PROLONGED QT



Electronically Signed On 2018 10:20:51 CDT by Abdoulaye Rincon MD

## 2018-06-20 NOTE — RAD
Acute abdominal series to include a PA chest radiograph 6/19/2018

 

Clinical History: Chest and abdominal pain.

 

A PA digital radiograph of the chest was obtained. Supine and erect AP 

digital radiographs of the abdomen/pelvis were obtained.

 

Comparison study is dated 9/25/2017.

 

A right internal jugular Sgazqn-e-Bzbd type catheter is seen with its tip 

extending to overlie the SVC/right atrial junction. The cardiac and 

mediastinal silhouettes are within normal limits in size and 

configuration. No pulmonary infiltrate is seen. No pleural effusion or 

pneumothorax is noted.

 

A gastrostomy tube overlies the body the stomach. Surgical clips are seen 

within the right upper quadrant abdomen consistent with a cholecystectomy.

A moderate to large amount stool seen throughout the colon. The abdominal 

bowel gas pattern is nonobstructive. There is no evidence of free air. No 

radiopaque calculus is seen. The osseous structures are grossly intact.

 

Impression: Moderate to large amount stool seen throughout the colon. 

Nonobstructive bowel gas pattern.

 

Electronically signed by: Jamey Easton MD (6/20/2018 12:17 AM) [facility]

## 2018-06-20 NOTE — SSS
ADMIT DATE:  06/20/2018



HISTORY OF PRESENT ILLNESS:  The patient is a 53-year-old  female

patient, who basically presented with being sedated with minimal response to

questions that was felt because of overdosage of her pain and sedative

medication.  Per the patient's , he advised that she has been taking

extra medication all day, was extremely sedated on arrival.  She has a history

of gastroparesis and has a gastrojejunostomy tube in place.  She was evaluated

and her lab work was significant for the fact that she has severe what seemed to

be severe respiratory alkalosis.  Her pH was 7.75.  Her pCO2 was 18 and pO2 was

114, bicarbonate was 25; however, her oxygen saturation was 100% on FiO2 of 21%.

 Otherwise, her CBC and comprehensive metabolic profile were unremarkable except

for mild hypokalemia and transpired that her gastrostomy tube is clogged

completely and given that she needs that to be changed and that she has almost a

nominal aphasia, although her CT scan was unremarkable.  A decision was made to

transfer her to [facility] to arrange for an MRI.  Consult

neurologist as well as gastroenterologist to replace her gastrostomy tube.  She

attempts to utter some words, but she was unable to finish a sentence and she

did recognize her son, but was unable to elaborate on any or answer any of our

questions.  She has been very fidgety, able to walk around, unable to settle

such that we could not even hook her to the IV line.



PAST MEDICAL HISTORY:  Significant for hypertension, relapsing pancreatitis,

intractable nausea and vomiting, Liliya-Calle tear and gastroparesis.



PAST SURGICAL HISTORY:  Significant for gastrostomy tube placement.  She has a

jejunostomy tube, Port-A-Cath placement, appendectomy, cholecystectomy, tubal

ligation, sphincterotomy, total abdominal hysterectomy and bilateral

salpingo-oophorectomy.



ALLERGIES:  SHE IS ALLERGIC TO TIMOLOL AND TRAMADOL.



MEDICATIONS:  She is currently on following medications:  She is on tizanidine 8

mg every 8 hours, oxycodone 20 mg per feeding tube every 6 hours, gabapentin 300

mg 3 times a day, citalopram hydrobromide 60 mg once a day, alprazolam 0.5 mg

twice a day.



FAMILY HISTORY:  She has one sister older.  Does have a Port-A-Cath.  Mother is

alive at age of 70 and she is known to have glaucoma.  Father is alive at the

age of 72 and healthy.



SOCIAL HISTORY:  She is , has 1 daughter and 2 sons.  She has never

smoked, she does not drink alcohol or recreational drugs.  She stays at home,

taking care of her .



REVIEW OF SYSTEMS:  As per history of present illness.



PHYSICAL EXAMINATION:

GENERAL:  On arrival to the Emergency Room, she was somewhat pale, no jaundice,

no cyanosis.  No lymphadenopathy, no thyromegaly.  No jugular venous distension.

 No lower limb edema.

VITAL SIGNS:  Her heart rate was 83, blood pressure was 98/62, temperature was

99, respiratory rate 20, and oxygen saturation was 96%.

HEENT:  Showed normocephalic, atraumatic.

NECK:  Supple.

HEART:  Showed normal first and second heart sounds.  No gallop, rub or murmur.

CHEST:  Shows central trachea, equal bilateral expansion, air entry, vesicular

sounds.  No crepitation or rhonchi.

ABDOMEN:  Slightly distended, soft with a gastrostomy tube in place, a

jejunostomy tube in place.  There is no tenderness.  No guarding or rigidity. 

No organomegaly.  Hernial orifice intact.  Bowel sounds normal.

NEUROLOGIC:  She is awake, alert, opens eyes, tracks and attempts to mouth

words, unable however to finish sentence or give us any useful information or

details about what happened; however, all her cranial nerves are intact.

EXTREMITIES:  She moves extremities without difficulty.  She is basically

extremely restless, agitated and unable to sit and she had been up and about

walking more than she normally does and we were unable to even hook her safely

to the IV fluid because of her restlessness.



LABORATORY DATA:  While in the Emergency Room, she has had lab work done, which

showed a white cell count of 6600, hemoglobin 11.6, hematocrit 36, MCV 76 and

platelet count 347,000.  Her blood gases were extremely strange with a pH of

7.75, a pCO2 of 18, pO2 of 114, bicarbonate 25, and oxygen saturation was 100%

on FiO2 21%.  Her serum sodium was 144, potassium 3.4, chloride 104, bicarbonate

26, anion gap of 14, BUN 8, creatinine 1, estimated GFR was 57 mL per minute. 

Her glucose 132, calcium was 9.4, magnesium was 1.8 and beta natriuretic peptide

was 889.  Her serum lipase and amylase were normal.  Her ammonia was less than

10.  Her prothrombin time was 10.4, INR of 1, aPTT was 21.  Urinalysis showed

the urine was yellow, clear with a pH of more than 8.5, specific gravity of

1.010.  There was small amount of protein.  The urine was negative for glucose,

trace of ketones, trace of blood, negative for nitrite and leukocyte esterase. 

There are no rbc's, no wbc's and no bacteria and her toxic screen was

essentially negative for opiates, methadone, barbiturates, phencyclidine,

amphetamine, methamphetamine, benzodiazepine, cocaine, cannabinoids and alcohol.

 She has had a CT scan of the head, which basically showed that there is

generalized parenchymal atrophy, areas of decreased attenuation are seen within

the periventricular and subcortical white matter of both cerebral hemispheres

consistent with areas more vessel ischemic disease, no acute parenchymal

abnormality seen.  No extraaxial fluid collection is noted.  No skull fracture

is seen.  She has acute abdomen series, which basically showed that there is

moderate to large amount of stool seen throughout the colon with nonobstructive

bowel gas pattern.  She has right internal jugular ____ type catheter is seen

with its tip extending to overlie the superior vena cava, right atrial junction.

 The cardiac and mediastinal silhouettes are within normal limits in size and

configuration.  No pulmonary infiltrates are seen.  No pleural effusion or

pneumothorax is noted.  A gastrostomy tube overlies the body of the stomach. 

Surgical clips are seen within the right upper quadrant of the abdomen

consistent with a cholecystectomy, a moderate to large amount of stool seen

throughout the colon.  The abdominal bowel gas pattern is nonobstructive.



ASSESSMENT AND PLAN:  Given altered mental status, severe respiratory alkalosis

and the fact that her gastrostomy tube is malfunctioning, I decided to transfer

her to [facility].  We will repeat all her lab work there

including blood gases.  Continue the IV fluid.  Consult the Neurology and

arrange for an MRI and also consult the Gastroenterology to replace the

gastrostomy tube.





______________________________

LEN PORTILLO MD



DR:  BYRON/rupesh  JOB#:  [id number] / [id number]

DD:  06/20/2018 11:08  DT:  06/20/2018 12:46

## 2018-06-20 NOTE — RAD
CT scan of the head without contrast 6/20/2018

 

Clinical History: Mental status changes.

 

Technique: Unenhanced, contiguous, 5 mm axial sections were obtained 

through the head.

 

One or more of the following individualized dose reduction techniques were

utilized for this study:

 

1. Automated exposure control.

2. Adjustment of the mA and/or kV according to patient size.

3. Use of iterative reconstruction technique.

 

 

 

Findings: Comparison study is dated 8/13/2017.

 

There is generalized parenchymal atrophy. Areas of decreased attenuation 

are seen within the periventricular and subcortical white matter of both 

cerebral hemispheres consistent with areas of small vessel ischemic 

disease. No acute parenchymal abnormality is seen. No extra-axial fluid 

collection is noted. No skull fracture is seen.

 

Impression: No acute intracranial abnormality is seen.

 

Electronically signed by: Jamey Easton MD (6/20/2018 12:27 AM) [facility]

## 2018-07-25 ENCOUNTER — HOSPITAL ENCOUNTER (EMERGENCY)
Dept: HOSPITAL 63 - ER | Age: 53
Discharge: HOME | End: 2018-07-25
Payer: SELF-PAY

## 2018-07-25 VITALS — WEIGHT: 166.67 LBS | HEIGHT: 72 IN | BODY MASS INDEX: 22.57 KG/M2

## 2018-07-25 VITALS — SYSTOLIC BLOOD PRESSURE: 134 MMHG | DIASTOLIC BLOOD PRESSURE: 44 MMHG

## 2018-07-25 DIAGNOSIS — Z88.6: ICD-10-CM

## 2018-07-25 DIAGNOSIS — Z93.1: ICD-10-CM

## 2018-07-25 DIAGNOSIS — Z91.041: ICD-10-CM

## 2018-07-25 DIAGNOSIS — L03.311: Primary | ICD-10-CM

## 2018-07-25 DIAGNOSIS — G89.29: ICD-10-CM

## 2018-07-25 DIAGNOSIS — Z88.8: ICD-10-CM

## 2018-07-25 PROCEDURE — 96372 THER/PROPH/DIAG INJ SC/IM: CPT

## 2018-07-25 PROCEDURE — 99284 EMERGENCY DEPT VISIT MOD MDM: CPT

## 2018-07-25 NOTE — PHYS DOC
Past History


Past Medical History:  Other


Additional Past Medical Histor:  history of gastroparesis


Past Surgical History:  Other


Additional Past Surgical Histo:  J-tube and G-tube placement


Smoking:  Non-smoker


Alcohol Use:  None


Drug Use:  Other





Adult General


Chief Complaint


Chief Complaint:  SKIN PROBLEM





HPI


HPI


This is a pleasant 53-year-old female presenting the emergency department with 

a rash around her G-tube. This started 2-3 days ago. It is mildly painful. 

Nonradiating and intermittent. She is been using topical corticosteroid cream 

with no relief. She has a history of chronic abdominal pain but denies any new 

changes.





Review of systems is negative for chest pain shortness of breath fevers or 

chills. All other review of systems is negative unless otherwise noted in 

history of present illness.





ED course: 53-year-old female presenting with a rash around her G-tube. On 

examination is mildly erythematous with satellite lesions. Cellulitis versus 

superficial fungal infection. We will give her dry nystatin powder along with 

oral antibiotics to follow-up with her doctor a few days for reexamination. The 

patient has been examined and was not found to have an emergency medical 

condition. The patient was then discharged home in stable condition to follow 

up with their primary care physician over the next 2-3 days. They were to 

return if their symptoms worsened or if they were concerned for any reason.  

They were also instructed to return to the emergency department if they were 

unable to get the recommended and appropriate follow-up.  Face-to-face 

discharge instructions and return precautions were given. Patient's questions 

were answered to their satisfaction. Patient is comfortable with plan.





Review of Systems


Review of Systems


SEE ABOVE.





Current Medications


Current Medications





Current Medications








 Medications


  (Trade)  Dose


 Ordered  Sig/Kari  Start Time


 Stop Time Status Last Admin


Dose Admin


 


 Ceftriaxone Sodium


  (Rocephin Im)  1 gm  1X  ONCE  7/25/18 14:00


 7/25/18 14:01   





 


 Lidocaine HCl  20 ml  STK-MED ONCE  7/25/18 13:55


 7/25/18 13:56 DC  





 


 Nystatin


  (Nystop)  1 jr  1X  ONCE  7/25/18 14:00


 7/25/18 14:01   














Allergies


Allergies





Allergies








Coded Allergies Type Severity Reaction Last Updated Verified


 


  trazodone Allergy Intermediate  11/13/17 Yes


 


  I S O L A T I O N *CONTACT* Allergy Unknown  11/13/17 Yes


 


  tramadol Adverse Reaction Intermediate HALLUCINATIONS 11/13/17 Yes











Physical Exam


Physical Exam


SEE ABOVE





Constitutional: Well developed, well nourished, no acute distress, non-toxic 

appearance. []


HENT: Normocephalic, atraumatic, bilateral external ears normal, oropharynx 

moist, no oral exudates, nose normal. []


Eyes: PERRLA, EOMI, conjunctiva normal, no discharge. [] 


Neck: Normal range of motion, no tenderness, supple, no stridor. [] 


Cardiovascular:Heart rate regular rhythm, no murmur []


Lungs & Thorax:  Bilateral breath sounds clear to auscultation []


Abdomen: Bowel sounds normal, soft, no tenderness, no masses, no pulsatile 

masses. g tube in place. j tube in place. No rebound tenderness or guarding. 

Mild rash around the G-tube site as above.


Skin: Warm, dry, no erythema, no rash. [] 


Back: No tenderness, no CVA tenderness. [] 


Extremities: No tenderness, no cyanosis, no clubbing, ROM intact, no edema. [] 


Neurologic: Alert and oriented X 3, normal motor function, normal sensory 

function, no focal deficits noted. []


Psychologic: Affect normal, judgement normal, mood normal. []





Current Patient Data


Vital Signs





 Vital Signs








  Date Time  Temp Pulse Resp B/P (MAP) Pulse Ox O2 Delivery O2 Flow Rate FiO2


 


7/25/18 13:20 98.0 84   100   











EKG


EKG


[]





Radiology/Procedures


Radiology/Procedures


[]





Course & Med Decision Making


Course & Med Decision Making


Pertinent Labs and Imaging studies reviewed. (See chart for details)





[]





Dragon Disclaimer


Dragon Disclaimer


This electronic medical record was generated, in whole or in part, using a 

voice recognition dictation system.





Departure


Departure:


Impression:  


 Primary Impression:  


 Abdominal wall cellulitis


Disposition:  01 HOME, SELF-CARE


Condition:  STABLE


Referrals:  


SOFIA ALLEN MD (PCP)


Patient Instructions:  Cellulitis, Easy-to-Read





Additional Instructions:  


Thank you for allowing us to participate in your care today.





Return to the emergency department you have any new or worsening symptoms, or 

if you are concerned for any reason. Return to emergency department if you have 

any  new or concerning symptoms including but not limited to fever, chills, 

nausea, vomiting, intractable pain, any new rashes, chest pain, shortness of air

, uncontrolled bleeding, difficulty breathing, and/or vision loss.





Follow up with your primary care physician within 3 days.  Call your Primary 

Doctor tomorrow and inform them of your visit today.  If you do not have a 

primary care provider we are happy to provide you with a list of our primary 

care providers contact information. 





This condition should be evaluated by your primary care physician and any 

recommended consulting services for continued management within 2-3 days after 

discharge. If at any time, you are having difficulty getting into your primary 

care doctor or a specialist, return to the emergency department.


Scripts


Nystatin (NYSTATIN) 15 Gm Powder


1 JR TP BID for 5 Days, #1 BOTTLE


   Prov: MILO LOCKHART MD         7/25/18 


Cephalexin (KEFLEX) 500 Mg Capsule


1 CAP PO TID for 5 Days, #15 CAP 0 Refills


   Prov: MILO LOCKHART MD         7/25/18











MILO LOCKHART MD Jul 25, 2018 13:59